# Patient Record
Sex: FEMALE | Race: WHITE | Employment: FULL TIME | URBAN - METROPOLITAN AREA
[De-identification: names, ages, dates, MRNs, and addresses within clinical notes are randomized per-mention and may not be internally consistent; named-entity substitution may affect disease eponyms.]

---

## 2017-01-13 ENCOUNTER — THERAPY VISIT (OUTPATIENT)
Dept: CHIROPRACTIC MEDICINE | Facility: CLINIC | Age: 57
End: 2017-01-13
Payer: COMMERCIAL

## 2017-01-13 DIAGNOSIS — M54.50 CHRONIC LOW BACK PAIN WITHOUT SCIATICA, UNSPECIFIED BACK PAIN LATERALITY: ICD-10-CM

## 2017-01-13 DIAGNOSIS — M99.04 SOMATIC DYSFUNCTION OF SACRAL REGION: Primary | ICD-10-CM

## 2017-01-13 DIAGNOSIS — G89.29 CHRONIC RIGHT SHOULDER PAIN: ICD-10-CM

## 2017-01-13 DIAGNOSIS — G44.221 CHRONIC TENSION-TYPE HEADACHE, INTRACTABLE: ICD-10-CM

## 2017-01-13 DIAGNOSIS — M99.00 HEAD REGION SOMATIC DYSFUNCTION: ICD-10-CM

## 2017-01-13 DIAGNOSIS — M54.2 CERVICALGIA: ICD-10-CM

## 2017-01-13 DIAGNOSIS — M25.511 CHRONIC RIGHT SHOULDER PAIN: ICD-10-CM

## 2017-01-13 DIAGNOSIS — M25.551 HIP PAIN, RIGHT: ICD-10-CM

## 2017-01-13 DIAGNOSIS — G89.29 CHRONIC LOW BACK PAIN WITHOUT SCIATICA, UNSPECIFIED BACK PAIN LATERALITY: ICD-10-CM

## 2017-01-13 PROCEDURE — 98941 CHIROPRACT MANJ 3-4 REGIONS: CPT | Mod: AT | Performed by: CHIROPRACTOR

## 2017-01-13 PROCEDURE — 97035 APP MDLTY 1+ULTRASOUND EA 15: CPT | Performed by: CHIROPRACTOR

## 2017-01-13 NOTE — PROGRESS NOTES
VISIT #:1/2017    SUBJECTIVE:  Chronic pain.   The pt reports R neck and R shoulder pain that radiates into the R lateral arm area. She was travelling for 10 days and recently returned. The pt reports HA. She states she is unable to reduce her discomfort. The pt denies weakness in the extremities or other unusual sx.       OBJECTIVE: C2, C3, T4, T5, T8, L5, suboccipitals, R SCM ( lesser suboccipital nerve) R levator scapula, B QL, R gluteus medius hypertonicity with tenderness to palpation.     ASSESSMENT/PLAN : treatment for chronic pain when exacerbation occurs.    Communicated to patient to extend time between treatment and focus on HEP from PT  to control the pain thereby not relying on manual treatment.       Diagnoses (See diagnoses listed above in encounter notes.)    Response to previous treatment:  Exacerbation/regression       Chiropractic manipulation: C2, C3, T4, T5, T9, T10, T12 Occiput, 3 level manual adjustment    Extraspinal adjustment: NONE TODAY   LAD to R hip with cavitation     Modalities: US Therapy to the R latissimus dorsi -side lying  @ 1.6 christensen/cm2 8 minutes     Therapeutic exercise: none     FU: 1 week with US therapy or ACP    Chronic pain. pt stable   Continue with PT as recommended

## 2017-02-06 ENCOUNTER — THERAPY VISIT (OUTPATIENT)
Dept: CHIROPRACTIC MEDICINE | Facility: CLINIC | Age: 57
End: 2017-02-06
Payer: COMMERCIAL

## 2017-02-06 DIAGNOSIS — M99.02 THORACIC SEGMENT DYSFUNCTION: ICD-10-CM

## 2017-02-06 DIAGNOSIS — M99.04 SOMATIC DYSFUNCTION OF SACRAL REGION: ICD-10-CM

## 2017-02-06 DIAGNOSIS — G44.221 CHRONIC TENSION-TYPE HEADACHE, INTRACTABLE: ICD-10-CM

## 2017-02-06 DIAGNOSIS — G89.29 CHRONIC RIGHT SHOULDER PAIN: Primary | ICD-10-CM

## 2017-02-06 DIAGNOSIS — M99.00 HEAD REGION SOMATIC DYSFUNCTION: ICD-10-CM

## 2017-02-06 DIAGNOSIS — M54.2 CERVICALGIA: ICD-10-CM

## 2017-02-06 DIAGNOSIS — M25.511 CHRONIC RIGHT SHOULDER PAIN: Primary | ICD-10-CM

## 2017-02-06 PROCEDURE — 98941 CHIROPRACT MANJ 3-4 REGIONS: CPT | Mod: AT | Performed by: CHIROPRACTOR

## 2017-02-06 PROCEDURE — 97035 APP MDLTY 1+ULTRASOUND EA 15: CPT | Performed by: CHIROPRACTOR

## 2017-02-06 NOTE — PROGRESS NOTES
VISIT #:3/2017 Chronic pain.     SUBJECTIVE:   The pt reports at least 30% less pain in the R shoulder area post treatment. She noted ability to move the R shoulder without issue. Pain levels have decreased and the frequency of radiating pain to the R elbow is much less. The pt has a low grade HA today in addition to tension at the base of the skull. The pt denies weakness in the extremities or other unusual sx.       OBJECTIVE: C2, C3, T4, T5, T8, L5, suboccipitals, R SCM ( lesser suboccipital nerve) R levator scapula, B QL, R gluteus medius hypertonicity with tenderness to palpation.     ASSESSMENT/PLAN : treatment for chronic pain when exacerbation occurs.    Communicated to patient to extend time between treatment and focus on HEP from PT  to control the pain thereby not relying on manual treatment.       Diagnoses (See diagnoses listed above in encounter notes.)    Response to previous treatment:  Exacerbation/regression       Chiropractic manipulation: C2, C3, T4, T5, T9, T10, T12 Occiput, 3 level manual adjustment    Extraspinal adjustment: NONE TODAY   LAD to R hip with cavitation     Modalities: US Therapy to the R latissimus dorsi/teres prone  @ 1.6 christensen/cm2 8 minutes     Therapeutic exercise: none     FU: 2-3 week with US therapy or ACP for up to 5 treatments    Chronic pain. pt stable   Continue with PT as recommended

## 2017-02-17 ENCOUNTER — THERAPY VISIT (OUTPATIENT)
Dept: CHIROPRACTIC MEDICINE | Facility: CLINIC | Age: 57
End: 2017-02-17
Payer: COMMERCIAL

## 2017-02-17 DIAGNOSIS — M25.551 HIP PAIN, RIGHT: ICD-10-CM

## 2017-02-17 DIAGNOSIS — M99.00 HEAD REGION SOMATIC DYSFUNCTION: ICD-10-CM

## 2017-02-17 DIAGNOSIS — G89.29 CHRONIC LOW BACK PAIN WITHOUT SCIATICA, UNSPECIFIED BACK PAIN LATERALITY: ICD-10-CM

## 2017-02-17 DIAGNOSIS — M54.50 CHRONIC LOW BACK PAIN WITHOUT SCIATICA, UNSPECIFIED BACK PAIN LATERALITY: ICD-10-CM

## 2017-02-17 DIAGNOSIS — G44.221 CHRONIC TENSION-TYPE HEADACHE, INTRACTABLE: ICD-10-CM

## 2017-02-17 DIAGNOSIS — M99.04 SOMATIC DYSFUNCTION OF SACRAL REGION: ICD-10-CM

## 2017-02-17 DIAGNOSIS — M99.02 THORACIC SEGMENT DYSFUNCTION: ICD-10-CM

## 2017-02-17 DIAGNOSIS — M54.2 CERVICALGIA: ICD-10-CM

## 2017-02-17 DIAGNOSIS — G89.29 CHRONIC RIGHT SHOULDER PAIN: Primary | ICD-10-CM

## 2017-02-17 DIAGNOSIS — M25.511 CHRONIC RIGHT SHOULDER PAIN: Primary | ICD-10-CM

## 2017-02-17 PROCEDURE — 97035 APP MDLTY 1+ULTRASOUND EA 15: CPT | Performed by: CHIROPRACTOR

## 2017-02-17 PROCEDURE — 98940 CHIROPRACT MANJ 1-2 REGIONS: CPT | Mod: AT | Performed by: CHIROPRACTOR

## 2017-02-17 NOTE — PROGRESS NOTES
VISIT #:4/2017 Chronic pain.     SUBJECTIVE:   The pt reports at least 40% less pain in the R shoulder area post treatment. She noted ability to move the R shoulder without issue. Pain levels have decreased and the frequency of radiating pain to the R elbow is much less. She was able to play golf in [place] with minimal issue. The pt reports R sided neck pain and upper shoulder tension.     OBJECTIVE: C2, C3, T4, T5, T8, L5, suboccipitals, R SCM ( lesser suboccipital nerve) R levator scapula, B QL, R gluteus medius hypertonicity with tenderness to palpation.     ASSESSMENT/PLAN : treatment for chronic pain when exacerbation occurs.    Communicated to patient to extend time between treatment and focus on HEP from PT  to control the pain thereby not relying on manual treatment.       Diagnoses (See diagnoses listed above in encounter notes.)    Response to previous treatment:  Exacerbation/regression       Chiropractic manipulation: C2, C3, T4, T5,T12 Occiput  Extraspinal adjustment: NONE TODAY   LAD to R hip with cavitation     Modalities: US Therapy to the R latissimus dorsi/teres prone  @ 1.8 christensen/cm2 8 minutes     Therapeutic exercise: none     FU: 2-3 week with US therapy or ACP for up to 5 treatments    Chronic pain. pt stable   Continue with PT as recommended

## 2017-02-17 NOTE — MR AVS SNAPSHOT
After Visit Summary   2/17/2017    Sulema Murphy    MRN: [id number]           Patient Information     Date Of Birth          1960        Visit Information        Provider Department      2/17/2017 7:30 AM Suzi Whittington DC [facility]        Today's Diagnoses     Chronic right shoulder pain    -  1    Thoracic segment dysfunction        Head region somatic dysfunction        Somatic dysfunction of sacral region        Hip pain, right        Chronic tension-type headache, intractable        Cervicalgia        Chronic low back pain without sciatica, unspecified back pain laterality           Follow-ups after your visit        Your next 10 appointments already scheduled     Mar 06, 2017  7:30 AM CST   [facility] with TEGAN Baez ([facility]  )    [address] [place] [address]. [address]  [place] [address] [phone]              Who to contact     If you have questions or need follow up information about today's clinic visit or your schedule please contact [facility] directly at [phone].  Normal or non-critical lab and imaging results will be communicated to you by Pluto.TVhart, letter or phone within 4 business days after the clinic has received the results. If you do not hear from us within 7 days, please contact the clinic through Cytoxt or phone. If you have a critical or abnormal lab result, we will notify you by phone as soon as possible.  Submit refill requests through FitnessManager or call your pharmacy and they will forward the refill request to us. Please allow 3 business days for your refill to be completed.          Additional Information About Your Visit        Pluto.TVhart Information     FitnessManager gives you secure access to your electronic health record. If you see a primary care provider, you can also send messages to your care team and make appointments.  If you have questions, please call your primary care clinic.  If you do not have a primary care provider, please call [phone] and they will assist you.        Care EveryWhere ID     This is your Care EveryWhere ID. This could be used by other organizations to access your [facility] medical records  [id number]         Blood Pressure from Last 3 Encounters:   02/22/16 114/78   08/14/13 112/62   05/14/15 121/79    Weight from Last 3 Encounters:   02/22/16 53.5 kg (118 lb)   08/14/13 54.4 kg (120 lb)   05/14/15 54.9 kg (121 lb)              We Performed the Following     CHIROPRAC MANIP,SPINAL,1-2 REGIONS     ULTRASOUND THERAPY        Primary Care Provider Office Phone # Fax #    [facility] [phone]       [address] [place] [address], [address]  [facility] [address]        Thank you!     Thank you for choosing [facility]  [facility] [place] [facility]IR[facility]  for your care. Our goal is always to provide you with excellent care. Hearing back from our patients is one way we can continue to improve our services. Please take a few minutes to complete the written survey that you may receive in the mail after your visit with us. Thank you!             Your Updated Medication List - Protect others around you: Learn how to safely use, store and throw away your medicines at www.disposemymeds.org.          This list is accurate as of: 2/17/17  7:53 AM.  Always use your most recent med list.                   Brand Name Dispense Instructions for use    CALCIUM PO      Take  by mouth.       cycloSPORINE 0.05 % ophthalmic emulsion    RESTASIS     1 drop every 12 hours.       GLUCOSAMINE-CHONDROITIN PO      Take  by mouth.       METROLOTION 0.75 % Lotn   Generic drug:  metroNIDAZOLE      Apply thin layer to the affected area by topical route 2 times per day in the morning and evening       MULTI-VITAMINS PO      Take  by mouth.       SYNTHROID 137 MCG tablet   Generic drug:   levothyroxine     3 MONTHS    ONE TABLET DAILY IN THE MORNING

## 2017-03-06 ENCOUNTER — THERAPY VISIT (OUTPATIENT)
Dept: CHIROPRACTIC MEDICINE | Facility: CLINIC | Age: 57
End: 2017-03-06
Payer: COMMERCIAL

## 2017-03-06 DIAGNOSIS — M54.2 CERVICALGIA: ICD-10-CM

## 2017-03-06 DIAGNOSIS — M25.551 HIP PAIN, RIGHT: ICD-10-CM

## 2017-03-06 DIAGNOSIS — G89.29 CHRONIC RIGHT SHOULDER PAIN: Primary | ICD-10-CM

## 2017-03-06 DIAGNOSIS — M54.50 CHRONIC LOW BACK PAIN WITHOUT SCIATICA, UNSPECIFIED BACK PAIN LATERALITY: ICD-10-CM

## 2017-03-06 DIAGNOSIS — M99.02 THORACIC SEGMENT DYSFUNCTION: ICD-10-CM

## 2017-03-06 DIAGNOSIS — M99.04 SOMATIC DYSFUNCTION OF SACRAL REGION: ICD-10-CM

## 2017-03-06 DIAGNOSIS — G44.221 CHRONIC TENSION-TYPE HEADACHE, INTRACTABLE: ICD-10-CM

## 2017-03-06 DIAGNOSIS — M99.00 HEAD REGION SOMATIC DYSFUNCTION: ICD-10-CM

## 2017-03-06 DIAGNOSIS — G89.29 CHRONIC LOW BACK PAIN WITHOUT SCIATICA, UNSPECIFIED BACK PAIN LATERALITY: ICD-10-CM

## 2017-03-06 DIAGNOSIS — M25.511 CHRONIC RIGHT SHOULDER PAIN: Primary | ICD-10-CM

## 2017-03-06 PROCEDURE — 97035 APP MDLTY 1+ULTRASOUND EA 15: CPT | Performed by: CHIROPRACTOR

## 2017-03-06 PROCEDURE — 98940 CHIROPRACT MANJ 1-2 REGIONS: CPT | Mod: AT | Performed by: CHIROPRACTOR

## 2017-03-06 NOTE — MR AVS SNAPSHOT
After Visit Summary   3/6/2017    Sulema Murphy    MRN: [id number]           Patient Information     Date Of Birth          1960        Visit Information        Provider Department      3/6/2017 7:30 AM Suzi Whittington [facility] [place] [facility]        Today's Diagnoses     Chronic right shoulder pain    -  1    Thoracic segment dysfunction        Head region somatic dysfunction        Somatic dysfunction of sacral region        Hip pain, right        Chronic tension-type headache, intractable        Cervicalgia        Chronic low back pain without sciatica, unspecified back pain laterality           Follow-ups after your visit        Your next 10 appointments already scheduled     Mar 20, 2017  1:30 PM CDT   [facility] Chiropractor with TEGAN Baez ([facility]  )    [address]  [place] [id number] [phone]            Apr 03, 2017  1:30 PM CDT   [facility] Chiropractor with TEGAN Baez ([facility] [place]  )    [address]  [place] [id number] [phone]              Who to contact     If you have questions or need follow up information about today's clinic visit or your schedule please contact [facility] directly at [phone].  Normal or non-critical lab and imaging results will be communicated to you by Revision Militaryhart, letter or phone within 4 business days after the clinic has received the results. If you do not hear from us within 7 days, please contact the clinic through Revision Militaryhart or phone. If you have a critical or abnormal lab result, we will notify you by phone as soon as possible.  Submit refill requests through Synbiota or call your pharmacy and they will forward the refill request to us. Please allow 3 business days for your refill to be completed.          Additional Information About Your Visit        Revision MilitaryharBigDoor Information     Synbiota gives you secure access to your electronic health record. If you see  a primary care provider, you can also send messages to your care team and make appointments. If you have questions, please call your primary care clinic.  If you do not have a primary care provider, please call [phone] and they will assist you.        Care EveryWhere ID     This is your Care EveryWhere ID. This could be used by other organizations to access your [facility] medical records  [id number]         Blood Pressure from Last 3 Encounters:   02/22/16 114/78   08/14/13 112/62   05/14/15 121/79    Weight from Last 3 Encounters:   02/22/16 53.5 kg (118 lb)   08/14/13 54.4 kg (120 lb)   05/14/15 54.9 kg (121 lb)              We Performed the Following     CHIROPRAC MANIP,SPINAL,1-2 REGIONS     ULTRASOUND THERAPY        Primary Care Provider Office Phone # Fax #    [facility] [phone]       [address] [place] [address], [address]  [facility] [address]        Thank you!     Thank you for choosing [facility]  for your care. Our goal is always to provide you with excellent care. Hearing back from our patients is one way we can continue to improve our services. Please take a few minutes to complete the written survey that you may receive in the mail after your visit with us. Thank you!             Your Updated Medication List - Protect others around you: Learn how to safely use, store and throw away your medicines at www.disposemymeds.org.          This list is accurate as of: 3/6/17 11:23 AM.  Always use your most recent med list.                   Brand Name Dispense Instructions for use    CALCIUM PO      Take  by mouth.       cycloSPORINE 0.05 % ophthalmic emulsion    RESTASIS     1 drop every 12 hours.       GLUCOSAMINE-CHONDROITIN PO      Take  by mouth.       METROLOTION 0.75 % Lotn   Generic drug:  metroNIDAZOLE      Apply thin layer to the affected area by topical route 2 times per day in the morning and evening       MULTI-VITAMINS PO      Take  by mouth.        SYNTHROID 137 MCG tablet   Generic drug:  levothyroxine     3 MONTHS    ONE TABLET DAILY IN THE MORNING

## 2017-03-06 NOTE — PROGRESS NOTES
VISIT #:5/2017 Chronic pain.     SUBJECTIVE:   The pt reports at least 50% less pain in the R shoulder area post treatment. She will feel radiating pain in the R arm to the thumb that seems to originate in the R shoulder area. This will not cause weakness. The pt will feel the paresthesia in the middle of the day. Golfing and other repetitious activities will aggravate the px. The pt experienced moderate HA this weekend with no specific cause. She denies weakness or other unusual sx.     OBJECTIVE: C2, C3, T4, T5, T8, L5, suboccipitals, R SCM ( lesser suboccipital nerve) R levator scapula, B QL, R gluteus medius hypertonicity with tenderness to palpation.     ASSESSMENT/PLAN : treatment for chronic pain when exacerbation occurs.    Communicated to patient to extend time between treatment and focus on HEP from PT  to control the pain thereby not relying on manual treatment.       Diagnoses (See diagnoses listed above in encounter notes.)    Response to previous treatment:  Pt responding well to therapy       Chiropractic manipulation: C2, C3, T4, T5,T12 Occiput  Extraspinal adjustment: NONE TODAY   LAD to R hip with cavitation     Modalities: US Therapy to the R latissimus dorsi/teres prone  @ 1.6 christensen/cm2 8 minutes -side lying    Therapeutic exercise: none     FU: 2-3 week with US therapy or ACP for up to 5 treatments    Chronic pain. pt stable   Continue with PT as recommended

## 2017-03-20 ENCOUNTER — THERAPY VISIT (OUTPATIENT)
Dept: CHIROPRACTIC MEDICINE | Facility: CLINIC | Age: 57
End: 2017-03-20
Payer: COMMERCIAL

## 2017-03-20 DIAGNOSIS — G89.29 CHRONIC LOW BACK PAIN WITHOUT SCIATICA, UNSPECIFIED BACK PAIN LATERALITY: ICD-10-CM

## 2017-03-20 DIAGNOSIS — G89.29 CHRONIC RIGHT SHOULDER PAIN: Primary | ICD-10-CM

## 2017-03-20 DIAGNOSIS — G44.221 CHRONIC TENSION-TYPE HEADACHE, INTRACTABLE: ICD-10-CM

## 2017-03-20 DIAGNOSIS — M54.50 CHRONIC LOW BACK PAIN WITHOUT SCIATICA, UNSPECIFIED BACK PAIN LATERALITY: ICD-10-CM

## 2017-03-20 DIAGNOSIS — M25.511 CHRONIC RIGHT SHOULDER PAIN: Primary | ICD-10-CM

## 2017-03-20 DIAGNOSIS — M99.00 HEAD REGION SOMATIC DYSFUNCTION: ICD-10-CM

## 2017-03-20 DIAGNOSIS — M99.04 SOMATIC DYSFUNCTION OF SACRAL REGION: ICD-10-CM

## 2017-03-20 DIAGNOSIS — M99.02 THORACIC SEGMENT DYSFUNCTION: ICD-10-CM

## 2017-03-20 DIAGNOSIS — M54.2 CERVICALGIA: ICD-10-CM

## 2017-03-20 DIAGNOSIS — M25.551 HIP PAIN, RIGHT: ICD-10-CM

## 2017-03-20 PROCEDURE — 98941 CHIROPRACT MANJ 3-4 REGIONS: CPT | Mod: AT | Performed by: CHIROPRACTOR

## 2017-03-20 PROCEDURE — 97810 ACUP 1/> WO ESTIM 1ST 15 MIN: CPT | Mod: GA | Performed by: CHIROPRACTOR

## 2017-03-20 NOTE — MR AVS SNAPSHOT
After Visit Summary   3/20/2017    Sulema Murphy    MRN: [id number]           Patient Information     Date Of Birth          1960        Visit Information        Provider Department      3/20/2017 1:30 PM Suzi Whittington DC [facility] [place] [facility]        Today's Diagnoses     Chronic right shoulder pain    -  1    Thoracic segment dysfunction        Head region somatic dysfunction        Somatic dysfunction of sacral region        Hip pain, right        Chronic tension-type headache, intractable        Cervicalgia        Chronic low back pain without sciatica, unspecified back pain laterality           Follow-ups after your visit        Your next 10 appointments already scheduled     Apr 03, 2017  1:30 PM CDT   [facility] Chiropractor with TEGAN Baez ([facility]  )    [address]  [place] [address] [phone]              Who to contact     If you have questions or need follow up information about today's clinic visit or your schedule please contact [facility] directly at [phone].  Normal or non-critical lab and imaging results will be communicated to you by Training Intelligencehart, letter or phone within 4 business days after the clinic has received the results. If you do not hear from us within 7 days, please contact the clinic through Intoloopt or phone. If you have a critical or abnormal lab result, we will notify you by phone as soon as possible.  Submit refill requests through Telelogos or call your pharmacy and they will forward the refill request to us. Please allow 3 business days for your refill to be completed.          Additional Information About Your Visit        Training Intelligencehart Information     Telelogos gives you secure access to your electronic health record. If you see a primary care provider, you can also send messages to your care team and make appointments. If you have questions, please call your primary care clinic.  If you do not have a  primary care provider, please call [phone] and they will assist you.        Care EveryWhere ID     This is your Care EveryWhere ID. This could be used by other organizations to access your [place] medical records  [id number]         Blood Pressure from Last 3 Encounters:   02/22/16 114/78   08/14/13 112/62   05/14/15 121/79    Weight from Last 3 Encounters:   02/22/16 53.5 kg (118 lb)   08/14/13 54.4 kg (120 lb)   05/14/15 54.9 kg (121 lb)              We Performed the Following     ACUPUNCTURE, 1+ NEEDLES, W/O ELECTRICAL STIM; INIT 15 MIN PERSONAL CONTACT     CHIROPRAC MANIP,SPINAL,3-4 REGIONS        Primary Care Provider Office Phone # Fax #    [facility] [phone]       [facility] [facility], [address]  [place] [address]        Thank you!     Thank you for choosing [facility]  for your care. Our goal is always to provide you with excellent care. Hearing back from our patients is one way we can continue to improve our services. Please take a few minutes to complete the written survey that you may receive in the mail after your visit with us. Thank you!             Your Updated Medication List - Protect others around you: Learn how to safely use, store and throw away your medicines at www.disposemymeds.org.          This list is accurate as of: 3/20/17  3:03 PM.  Always use your most recent med list.                   Brand Name Dispense Instructions for use    CALCIUM PO      Take  by mouth.       cycloSPORINE 0.05 % ophthalmic emulsion    RESTASIS     1 drop every 12 hours.       GLUCOSAMINE-CHONDROITIN PO      Take  by mouth.       METROLOTION 0.75 % Lotn   Generic drug:  metroNIDAZOLE      Apply thin layer to the affected area by topical route 2 times per day in the morning and evening       MULTI-VITAMINS PO      Take  by mouth.       SYNTHROID 137 MCG tablet   Generic drug:  levothyroxine     3 MONTHS    ONE TABLET DAILY IN THE MORNING

## 2017-03-20 NOTE — PROGRESS NOTES
VISIT #:5/2017 Chronic pain.     SUBJECTIVE:   The pt reports at least 50% less pain in the R shoulder area post treatment. She continues to feel improvement from therapy. Today she would like to try ACP for a few sessions. The pt continues with all HEP from PT to manage the R shoulder and arm. The pt reports R upper cervical tension and decreased ability to turn the head. She denies HA since the previous therapy. The pt is pleased with her progress.     OBJECTIVE: C2, C3, T4, T5, T8, L5, suboccipitals, R SCM ( lesser suboccipital nerve) R levator scapula, B QL, R gluteus medius hypertonicity with tenderness to palpation.     ASSESSMENT/PLAN : treatment for chronic pain when exacerbation occurs.    Communicated to patient to extend time between treatment and focus on HEP from PT  to control the pain thereby not relying on manual treatment.       Diagnoses (See diagnoses listed above in encounter notes.)    Response to previous treatment:  Pt responding well to therapy       Chiropractic manipulation: C2, C3, T4, T5,T12 Occiput right activator and manual adjustment  Extraspinal adjustment: NONE TODAY   LAD to R hip with cavitation     Modalities: ACP points to the R R/C and cervical musculature 15 minutes prone       Therapeutic exercise: none     FU: 2-3 weeks  ACP for up to 5-6 treatments    Chronic pain. pt stable   Continue with PT as recommended

## 2017-04-03 ENCOUNTER — THERAPY VISIT (OUTPATIENT)
Dept: CHIROPRACTIC MEDICINE | Facility: CLINIC | Age: 57
End: 2017-04-03
Payer: COMMERCIAL

## 2017-04-03 DIAGNOSIS — G89.29 CHRONIC LOW BACK PAIN WITHOUT SCIATICA, UNSPECIFIED BACK PAIN LATERALITY: ICD-10-CM

## 2017-04-03 DIAGNOSIS — M54.2 CERVICALGIA: ICD-10-CM

## 2017-04-03 DIAGNOSIS — M99.02 THORACIC SEGMENT DYSFUNCTION: ICD-10-CM

## 2017-04-03 DIAGNOSIS — M99.04 SOMATIC DYSFUNCTION OF SACRAL REGION: ICD-10-CM

## 2017-04-03 DIAGNOSIS — M54.12 CERVICOBRACHIAL NEURALGIA: Primary | ICD-10-CM

## 2017-04-03 DIAGNOSIS — G89.29 CHRONIC RIGHT SHOULDER PAIN: ICD-10-CM

## 2017-04-03 DIAGNOSIS — M99.00 HEAD REGION SOMATIC DYSFUNCTION: ICD-10-CM

## 2017-04-03 DIAGNOSIS — G44.221 CHRONIC TENSION-TYPE HEADACHE, INTRACTABLE: ICD-10-CM

## 2017-04-03 DIAGNOSIS — M54.50 CHRONIC LOW BACK PAIN WITHOUT SCIATICA, UNSPECIFIED BACK PAIN LATERALITY: ICD-10-CM

## 2017-04-03 DIAGNOSIS — M25.511 CHRONIC RIGHT SHOULDER PAIN: ICD-10-CM

## 2017-04-03 PROCEDURE — 98940 CHIROPRACT MANJ 1-2 REGIONS: CPT | Mod: AT | Performed by: CHIROPRACTOR

## 2017-04-03 PROCEDURE — 97810 ACUP 1/> WO ESTIM 1ST 15 MIN: CPT | Mod: GA | Performed by: CHIROPRACTOR

## 2017-04-03 NOTE — MR AVS SNAPSHOT
After Visit Summary   4/3/2017    Sulema Murphy    MRN: [id number]           Patient Information     Date Of Birth          1960        Visit Information        Provider Department      4/3/2017 1:30 PM Suzi Whittington [facility] [facility]        Today's Diagnoses     Cervicobrachial neuralgia    -  1    Thoracic segment dysfunction        Head region somatic dysfunction        Somatic dysfunction of sacral region        Chronic right shoulder pain        Chronic tension-type headache, intractable        Cervicalgia        Chronic low back pain without sciatica, unspecified back pain laterality           Follow-ups after your visit        Your next 10 appointments already scheduled     Apr 17, 2017  7:45 AM CDT   [facility] Chiropractor with TEGAN Baez ([facility] [place]  )    [address]  [place] [id number] [phone]            May 01, 2017  7:30 AM CDT   [facility] Chiropractor with TEGAN Baez ([facility]  )    [address]  [place] [id number] [phone]              Who to contact     If you have questions or need follow up information about today's clinic visit or your schedule please contact [facility] directly at [phone].  Normal or non-critical lab and imaging results will be communicated to you by "Enfold[employer]."hart, letter or phone within 4 business days after the clinic has received the results. If you do not hear from us within 7 days, please contact the clinic through "Enfold[employer]hart or phone. If you have a critical or abnormal lab result, we will notify you by phone as soon as possible.  Submit refill requests through MaistorPlus or call your pharmacy and they will forward the refill request to us. Please allow 3 business days for your refill to be completed.          Additional Information About Your Visit        MaistorPlus Information     MaistorPlus gives you secure access to your electronic health record.  If you see a primary care provider, you can also send messages to your care team and make appointments. If you have questions, please call your primary care clinic.  If you do not have a primary care provider, please call [phone] and they will assist you.        Care EveryWhere ID     This is your Care EveryWhere ID. This could be used by other organizations to access your [facility] medical records  [id number]         Blood Pressure from Last 3 Encounters:   02/22/16 114/78   08/14/13 112/62   05/14/15 121/79    Weight from Last 3 Encounters:   02/22/16 53.5 kg (118 lb)   08/14/13 54.4 kg (120 lb)   05/14/15 54.9 kg (121 lb)              We Performed the Following     ACUPUNCTURE, 1+ NEEDLES, W/O ELECTRICAL STIM; INIT 15 MIN PERSONAL CONTACT     CHIROPRAC MANIP,SPINAL,1-2 REGIONS        Primary Care Provider Office Phone # Fax #    [facility] [phone]       [address] [place] [address], [address]  [facility] [address]        Thank you!     Thank you for choosing [facility]  for your care. Our goal is always to provide you with excellent care. Hearing back from our patients is one way we can continue to improve our services. Please take a few minutes to complete the written survey that you may receive in the mail after your visit with us. Thank you!             Your Updated Medication List - Protect others around you: Learn how to safely use, store and throw away your medicines at www.disposemymeds.org.          This list is accurate as of: 4/3/17  3:05 PM.  Always use your most recent med list.                   Brand Name Dispense Instructions for use    CALCIUM PO      Take  by mouth.       cycloSPORINE 0.05 % ophthalmic emulsion    RESTASIS     1 drop every 12 hours.       GLUCOSAMINE-CHONDROITIN PO      Take  by mouth.       METROLOTION 0.75 % Lotn   Generic drug:  metroNIDAZOLE      Apply thin layer to the affected area by topical route 2 times per day in the morning  and evening       MULTI-VITAMINS PO      Take  by mouth.       SYNTHROID 137 MCG tablet   Generic drug:  levothyroxine     3 MONTHS    ONE TABLET DAILY IN THE MORNING

## 2017-04-03 NOTE — PROGRESS NOTES
VISIT #:5/2017 Chronic pain.     SUBJECTIVE:   The pt reports at least 65% less pain in the R shoulder area post treatment. She continues to feel improvement from therapy. She notes decreasing nerve irritation  in the R arm and lateral fingers. Reaching higher does not increase pain in the R arm. She is pleased with her progress and denies weakness or other unusual sx.       OBJECTIVE: C2, C3, T4, T5, T8, L5, suboccipitals, R SCM ( lesser suboccipital nerve) R levator scapula, B QL, R gluteus medius hypertonicity with tenderness to palpation.     ASSESSMENT/PLAN : treatment for chronic pain when exacerbation occurs.    Communicated to patient to extend time between treatment and focus on HEP from PT  to control the pain thereby not relying on manual treatment.       Diagnoses (See diagnoses listed above in encounter notes.)    Response to previous treatment:  Pt responding well to therapy       Chiropractic manipulation: C2, C3, T4, T5,T12 Occiput right activator and manual adjustment  Extraspinal adjustment: NONE TODAY      Modalities: ACP points to the R R/C and cervical musculature 15 minutes prone       Therapeutic exercise: none     FU: 2-3 weeks  ACP for 2 additional treatments      Chronic pain. pt stable   Continue with PT as recommended

## 2017-04-17 ENCOUNTER — THERAPY VISIT (OUTPATIENT)
Dept: CHIROPRACTIC MEDICINE | Facility: CLINIC | Age: 57
End: 2017-04-17
Payer: COMMERCIAL

## 2017-04-17 DIAGNOSIS — M54.2 CERVICALGIA: ICD-10-CM

## 2017-04-17 DIAGNOSIS — M99.02 THORACIC SEGMENT DYSFUNCTION: ICD-10-CM

## 2017-04-17 DIAGNOSIS — M54.12 CERVICOBRACHIAL NEURALGIA: ICD-10-CM

## 2017-04-17 DIAGNOSIS — M25.511 CHRONIC RIGHT SHOULDER PAIN: ICD-10-CM

## 2017-04-17 DIAGNOSIS — G89.29 CHRONIC RIGHT SHOULDER PAIN: ICD-10-CM

## 2017-04-17 DIAGNOSIS — M99.00 HEAD REGION SOMATIC DYSFUNCTION: ICD-10-CM

## 2017-04-17 DIAGNOSIS — G44.221 CHRONIC TENSION-TYPE HEADACHE, INTRACTABLE: ICD-10-CM

## 2017-04-17 DIAGNOSIS — M99.04 SOMATIC DYSFUNCTION OF SACRAL REGION: ICD-10-CM

## 2017-04-17 DIAGNOSIS — M54.50 CHRONIC LOW BACK PAIN WITHOUT SCIATICA, UNSPECIFIED BACK PAIN LATERALITY: ICD-10-CM

## 2017-04-17 DIAGNOSIS — G89.29 CHRONIC LOW BACK PAIN WITHOUT SCIATICA, UNSPECIFIED BACK PAIN LATERALITY: ICD-10-CM

## 2017-04-17 PROCEDURE — 97810 ACUP 1/> WO ESTIM 1ST 15 MIN: CPT | Mod: GA | Performed by: CHIROPRACTOR

## 2017-04-17 PROCEDURE — 98940 CHIROPRACT MANJ 1-2 REGIONS: CPT | Mod: AT | Performed by: CHIROPRACTOR

## 2017-04-17 NOTE — PROGRESS NOTES
VISIT #:7/2017 Chronic pain- R shoulder/paresthesias    SUBJECTIVE:   The pt reports at least 65% less pain in the R shoulder area post treatment-no significant change from the previous week however less tingling in the R hand. She continues to feel improvement from therapy. She notes decreasing nerve irritation  in the R arm and lateral fingers. The pt experienced on HA this past week which she took medication for. Reaching higher does not increase pain in the R arm. She is pleased with her progress and denies weakness or other unusual sx.       OBJECTIVE: C2, C3 right, T9, T10 suboccipitals, R SCM ( lesser suboccipital nerve) R levator scapula, B QL, R gluteus medius hypertonicity with tenderness to palpation.     ASSESSMENT/PLAN : treatment for chronic pain when exacerbation occurs.    Communicated to patient to extend time between treatment and focus on HEP from PT  to control the pain thereby not relying on manual treatment.       Diagnoses (See diagnoses listed above in encounter notes.)    Response to previous treatment:  Pt responding well to therapy       Chiropractic manipulation: C2, C3, T10,T12 Occiput right activator and manual adjustment  Extraspinal adjustment: NONE TODAY      Modalities: ACP points to the R R/C and cervical musculature 15 minutes prone       Therapeutic exercise: none     FU: 2-3 weeks  ACP for 2 additional treatments      Chronic pain. pt stable   Continue with PT as recommended

## 2017-04-17 NOTE — MR AVS SNAPSHOT
After Visit Summary   4/17/2017    Sulema Murphy    MRN: [id number]           Patient Information     Date Of Birth          1960        Visit Information        Provider Department      4/17/2017 7:45 AM Suzi Whittington DC [facility]        Today's Diagnoses     Cervicobrachial neuralgia        Thoracic segment dysfunction        Head region somatic dysfunction        Somatic dysfunction of sacral region        Chronic right shoulder pain        Chronic tension-type headache, intractable        Cervicalgia        Chronic low back pain without sciatica, unspecified back pain laterality           Follow-ups after your visit        Your next 10 appointments already scheduled     May 01, 2017  7:30 AM CDT   [facility] with TEGAN Baez ([facility]  )    [address]  [place] [address] [phone]              Who to contact     If you have questions or need follow up information about today's clinic visit or your schedule please contact [facility] directly at [phone].  Normal or non-critical lab and imaging results will be communicated to you by Blendspacehart, letter or phone within 4 business days after the clinic has received the results. If you do not hear from us within 7 days, please contact the clinic through Music Factoryt or phone. If you have a critical or abnormal lab result, we will notify you by phone as soon as possible.  Submit refill requests through Nirvaha or call your pharmacy and they will forward the refill request to us. Please allow 3 business days for your refill to be completed.          Additional Information About Your Visit        Blendspacehart Information     Nirvaha gives you secure access to your electronic health record. If you see a primary care provider, you can also send messages to your care team and make appointments. If you have questions, please call your primary care clinic.  If you do not have a  primary care provider, please call [phone] and they will assist you.        Care EveryWhere ID     This is your Care EveryWhere ID. This could be used by other organizations to access your [facility] medical records  [id number]         Blood Pressure from Last 3 Encounters:   02/22/16 114/78   08/14/13 112/62   05/14/15 121/79    Weight from Last 3 Encounters:   02/22/16 53.5 kg (118 lb)   08/14/13 54.4 kg (120 lb)   05/14/15 54.9 kg (121 lb)              We Performed the Following     ACUPUNCTURE, 1+ NEEDLES, W/O ELECTRICAL STIM; INIT 15 MIN PERSONAL CONTACT     CHIROPRAC MANIP,SPINAL,1-2 REGIONS        Primary Care Provider Office Phone # Fax #    [facility] [phone]       [facility] [facility], [address]  [place] [address]        Thank you!     Thank you for choosing [facility]  for your care. Our goal is always to provide you with excellent care. Hearing back from our patients is one way we can continue to improve our services. Please take a few minutes to complete the written survey that you may receive in the mail after your visit with us. Thank you!             Your Updated Medication List - Protect others around you: Learn how to safely use, store and throw away your medicines at www.disposemymeds.org.          This list is accurate as of: 4/17/17  8:14 AM.  Always use your most recent med list.                   Brand Name Dispense Instructions for use    CALCIUM PO      Take  by mouth.       cycloSPORINE 0.05 % ophthalmic emulsion    RESTASIS     1 drop every 12 hours.       GLUCOSAMINE-CHONDROITIN PO      Take  by mouth.       METROLOTION 0.75 % Lotn   Generic drug:  metroNIDAZOLE      Apply thin layer to the affected area by topical route 2 times per day in the morning and evening       MULTI-VITAMINS PO      Take  by mouth.       SYNTHROID 137 MCG tablet   Generic drug:  levothyroxine     3 MONTHS    ONE TABLET DAILY IN THE MORNING

## 2017-05-01 ENCOUNTER — THERAPY VISIT (OUTPATIENT)
Dept: CHIROPRACTIC MEDICINE | Facility: CLINIC | Age: 57
End: 2017-05-01
Payer: COMMERCIAL

## 2017-05-01 DIAGNOSIS — M25.511 CHRONIC RIGHT SHOULDER PAIN: ICD-10-CM

## 2017-05-01 DIAGNOSIS — M99.04 SOMATIC DYSFUNCTION OF SACRAL REGION: ICD-10-CM

## 2017-05-01 DIAGNOSIS — M54.12 CERVICOBRACHIAL NEURALGIA: Primary | ICD-10-CM

## 2017-05-01 DIAGNOSIS — M99.02 THORACIC SEGMENT DYSFUNCTION: ICD-10-CM

## 2017-05-01 DIAGNOSIS — G89.29 CHRONIC RIGHT SHOULDER PAIN: ICD-10-CM

## 2017-05-01 DIAGNOSIS — M99.00 HEAD REGION SOMATIC DYSFUNCTION: ICD-10-CM

## 2017-05-01 DIAGNOSIS — M54.2 CERVICALGIA: ICD-10-CM

## 2017-05-01 DIAGNOSIS — G44.221 CHRONIC TENSION-TYPE HEADACHE, INTRACTABLE: ICD-10-CM

## 2017-05-01 PROCEDURE — 98940 CHIROPRACT MANJ 1-2 REGIONS: CPT | Mod: AT | Performed by: CHIROPRACTOR

## 2017-05-01 PROCEDURE — 97810 ACUP 1/> WO ESTIM 1ST 15 MIN: CPT | Mod: GA | Performed by: CHIROPRACTOR

## 2017-05-01 NOTE — PROGRESS NOTES
VISIT #:8/2017 Chronic pain- R shoulder/paresthesias    SUBJECTIVE:   The pt reports at least 75% less pain in the R shoulder area post treatment. She continues progressing from this episode. The pt reports significantly less R arm pain on a daily basis. She notes R sided thoracic paraspinal pain that causes moderate irritation when intermittently. She states it is so sore at times it will make her nauseous. She reports slight R sided neck pain today. The pt denies weakness or other unusual sx.     OBJECTIVE: C2, C3 right, T9, T10 suboccipitals, R SCM ( lesser suboccipital nerve) R levator scapula, B QL, R gluteus medius hypertonicity with tenderness to palpation.     ASSESSMENT/PLAN : treatment for chronic pain when exacerbation occurs.    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 Reduction of therapy when pt has reached 80-85% imporovement    Diagnoses (See diagnoses listed above in encounter notes.)    Response to previous treatment:  Pt responding well to therapy       Chiropractic manipulation: C2, C3, T10,T12 Occiput right activator and manual adjustment  Extraspinal adjustment: NONE TODAY      Modalities: ACP points to the R R/C and cervical musculature, R arm and hand. R thoracic paraspinals 15 minutes prone       Therapeutic exercise: none     FU: 2-3 weeks  ACP for 1-2 additional treatments      Chronic pain. pt stable   Continue with PT as recommended

## 2017-05-01 NOTE — MR AVS SNAPSHOT
After Visit Summary   5/1/2017    Sulema Murphy    MRN: [id number]           Patient Information     Date Of Birth          1960        Visit Information        Provider Department      5/1/2017 7:30 AM Suzi Whittington DC [facility]        Today's Diagnoses     Cervicobrachial neuralgia    -  1    Thoracic segment dysfunction        Head region somatic dysfunction        Somatic dysfunction of sacral region        Chronic right shoulder pain        Chronic tension-type headache, intractable        Cervicalgia           Follow-ups after your visit        Who to contact     If you have questions or need follow up information about today's clinic visit or your schedule please contact [facility] directly at [phone].  Normal or non-critical lab and imaging results will be communicated to you by MyChart, letter or phone within 4 business days after the clinic has received the results. If you do not hear from us within 7 days, please contact the clinic through Pressgramhart or phone. If you have a critical or abnormal lab result, we will notify you by phone as soon as possible.  Submit refill requests through Dizzion or call your pharmacy and they will forward the refill request to us. Please allow 3 business days for your refill to be completed.          Additional Information About Your Visit        MyChart Information     Dizzion gives you secure access to your electronic health record. If you see a primary care provider, you can also send messages to your care team and make appointments. If you have questions, please call your primary care clinic.  If you do not have a primary care provider, please call [phone] and they will assist you.        Care EveryWhere ID     This is your Care EveryWhere ID. This could be used by other organizations to access your [facility] medical records  [id number]         Blood Pressure from Last 3 Encounters:   02/22/16 114/78   08/14/13  112/62   05/14/15 121/79    Weight from Last 3 Encounters:   02/22/16 53.5 kg (118 lb)   08/14/13 54.4 kg (120 lb)   05/14/15 54.9 kg (121 lb)              We Performed the Following     ACUPUNCTURE, 1+ NEEDLES, W/O ELECTRICAL STIM; INIT 15 MIN PERSONAL CONTACT     CHIROPRAC MANIP,SPINAL,1-2 REGIONS        Primary Care Provider Office Phone # Fax #    [facility] [facility] [phone]       [address] [place] [address], [address]  [place] [address]        Thank you!     Thank you for choosing [facility]  for your care. Our goal is always to provide you with excellent care. Hearing back from our patients is one way we can continue to improve our services. Please take a few minutes to complete the written survey that you may receive in the mail after your visit with us. Thank you!             Your Updated Medication List - Protect others around you: Learn how to safely use, store and throw away your medicines at www.disposemymeds.org.          This list is accurate as of: 5/1/17  8:32 AM.  Always use your most recent med list.                   Brand Name Dispense Instructions for use    CALCIUM PO      Take  by mouth.       cycloSPORINE 0.05 % ophthalmic emulsion    RESTASIS     1 drop every 12 hours.       GLUCOSAMINE-CHONDROITIN PO      Take  by mouth.       METROLOTION 0.75 % Lotn   Generic drug:  metroNIDAZOLE      Apply thin layer to the affected area by topical route 2 times per day in the morning and evening       MULTI-VITAMINS PO      Take  by mouth.       SYNTHROID 137 MCG tablet   Generic drug:  levothyroxine     3 MONTHS    ONE TABLET DAILY IN THE MORNING

## 2017-05-15 ENCOUNTER — THERAPY VISIT (OUTPATIENT)
Dept: CHIROPRACTIC MEDICINE | Facility: CLINIC | Age: 57
End: 2017-05-15
Payer: COMMERCIAL

## 2017-05-15 DIAGNOSIS — M99.02 THORACIC SEGMENT DYSFUNCTION: ICD-10-CM

## 2017-05-15 DIAGNOSIS — M25.511 CHRONIC RIGHT SHOULDER PAIN: ICD-10-CM

## 2017-05-15 DIAGNOSIS — M54.2 CERVICALGIA: ICD-10-CM

## 2017-05-15 DIAGNOSIS — M99.00 HEAD REGION SOMATIC DYSFUNCTION: ICD-10-CM

## 2017-05-15 DIAGNOSIS — G89.29 CHRONIC RIGHT SHOULDER PAIN: ICD-10-CM

## 2017-05-15 DIAGNOSIS — M99.04 SOMATIC DYSFUNCTION OF SACRAL REGION: ICD-10-CM

## 2017-05-15 DIAGNOSIS — M54.12 CERVICOBRACHIAL NEURALGIA: Primary | ICD-10-CM

## 2017-05-15 DIAGNOSIS — G44.221 CHRONIC TENSION-TYPE HEADACHE, INTRACTABLE: ICD-10-CM

## 2017-05-15 PROCEDURE — 98940 CHIROPRACT MANJ 1-2 REGIONS: CPT | Mod: AT | Performed by: CHIROPRACTOR

## 2017-05-15 PROCEDURE — 97810 ACUP 1/> WO ESTIM 1ST 15 MIN: CPT | Mod: GA | Performed by: CHIROPRACTOR

## 2017-05-15 NOTE — PROGRESS NOTES
VISIT #:9/2017 Chronic pain- R shoulder/paresthesias    SUBJECTIVE:   The pt reports at least 75% less pain in the R shoulder area post treatment. She continues progressing from this episode. This weekend she noted an increase in R cervical pain with pain in the R thoracic paraspinal and R posterior shoulder blade. She denies significant pain in the R arm/hand which she is appreciative of. She also reported HA sx. The most irritation she feels is located in the R thoracic paraspinal area. She denies weakness or other unusual sx.       OBJECTIVE: C2, C3 right, T9, T10 suboccipitals, R SCM ( lesser suboccipital nerve) R levator scapula, B QL, R gluteus medius hypertonicity with tenderness to palpation.     ASSESSMENT/PLAN : treatment for chronic pain when exacerbation occurs.    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 Reduction of therapy when pt has reached 80-85% imporovement    Diagnoses (See diagnoses listed above in encounter notes.)    Response to previous treatment:  Pt responding well to therapy       Chiropractic manipulation: C2, C3, T10,T12 Occiput right activator and manual adjustment  Extraspinal adjustment: NONE TODAY      Modalities: ACP points to the R R/C and cervical musculature, R arm and hand. R thoracic paraspinals 15 minutes prone       Therapeutic exercise: none     FU: 2-3 weeks  ACP   Reduction of care if no change      Chronic pain. pt stable   Continue with PT as recommended

## 2017-05-15 NOTE — MR AVS SNAPSHOT
After Visit Summary   5/15/2017    Sulema Murphy    MRN: [id number]           Patient Information     Date Of Birth          1960        Visit Information        Provider Department      5/15/2017 9:00 AM Suzi Whittington [facility]        Today's Diagnoses     Cervicobrachial neuralgia    -  1    Thoracic segment dysfunction        Head region somatic dysfunction        Somatic dysfunction of sacral region        Chronic right shoulder pain        Chronic tension-type headache, intractable        Cervicalgia           Follow-ups after your visit        Your next 10 appointments already scheduled     Jun 02, 2017  7:30 AM CDT   [facility] with Suzi Whittington DC   [facility] - [place] [place] [facility] ([facility] [place])    [address] [place] [address]  [id number]  [place] [address] [phone]              Who to contact     If you have questions or need follow up information about today's clinic visit or your schedule please contact [facility] directly at [phone].  Normal or non-critical lab and imaging results will be communicated to you by MyChart, letter or phone within 4 business days after the clinic has received the results. If you do not hear from us within 7 days, please contact the clinic through FriendFithart or phone. If you have a critical or abnormal lab result, we will notify you by phone as soon as possible.  Submit refill requests through Retail Info or call your pharmacy and they will forward the refill request to us. Please allow 3 business days for your refill to be completed.          Additional Information About Your Visit        MyChart Information     Retail Info gives you secure access to your electronic health record. If you see a primary care provider, you can also send messages to your care team and make appointments. If you have questions, please call your primary care clinic.  If you do not have a primary care provider,  please call [phone] and they will assist you.        Care EveryWhere ID     This is your Care EveryWhere ID. This could be used by other organizations to access your [facility] medical records  [id number]         Blood Pressure from Last 3 Encounters:   02/22/16 114/78   08/14/13 112/62   05/14/15 121/79    Weight from Last 3 Encounters:   02/22/16 53.5 kg (118 lb)   08/14/13 54.4 kg (120 lb)   05/14/15 54.9 kg (121 lb)              We Performed the Following     ACUPUNCTURE, 1+ NEEDLES, W/O ELECTRICAL STIM; INIT 15 MIN PERSONAL CONTACT     CHIROPRAC MANIP,SPINAL,3-4 REGIONS        Primary Care Provider Office Phone # Fax #    [facility] [phone]       [address] [place] [address], [address]  [place] [address]        Thank you!     Thank you for choosing [facility]  for your care. Our goal is always to provide you with excellent care. Hearing back from our patients is one way we can continue to improve our services. Please take a few minutes to complete the written survey that you may receive in the mail after your visit with us. Thank you!             Your Updated Medication List - Protect others around you: Learn how to safely use, store and throw away your medicines at www.disposemymeds.org.          This list is accurate as of: 5/15/17  9:50 AM.  Always use your most recent med list.                   Brand Name Dispense Instructions for use    CALCIUM PO      Take  by mouth.       cycloSPORINE 0.05 % ophthalmic emulsion    RESTASIS     1 drop every 12 hours.       GLUCOSAMINE-CHONDROITIN PO      Take  by mouth.       METROLOTION 0.75 % Lotn   Generic drug:  metroNIDAZOLE      Apply thin layer to the affected area by topical route 2 times per day in the morning and evening       MULTI-VITAMINS PO      Take  by mouth.       SYNTHROID 137 MCG tablet   Generic drug:  levothyroxine     3 MONTHS    ONE TABLET DAILY IN THE MORNING

## 2017-06-02 ENCOUNTER — THERAPY VISIT (OUTPATIENT)
Dept: CHIROPRACTIC MEDICINE | Facility: CLINIC | Age: 57
End: 2017-06-02
Payer: COMMERCIAL

## 2017-06-02 DIAGNOSIS — G44.221 CHRONIC TENSION-TYPE HEADACHE, INTRACTABLE: ICD-10-CM

## 2017-06-02 DIAGNOSIS — M54.2 CERVICALGIA: ICD-10-CM

## 2017-06-02 DIAGNOSIS — M99.04 SOMATIC DYSFUNCTION OF SACRAL REGION: ICD-10-CM

## 2017-06-02 DIAGNOSIS — M54.12 CERVICOBRACHIAL NEURALGIA: Primary | ICD-10-CM

## 2017-06-02 DIAGNOSIS — M25.511 CHRONIC RIGHT SHOULDER PAIN: ICD-10-CM

## 2017-06-02 DIAGNOSIS — M99.00 HEAD REGION SOMATIC DYSFUNCTION: ICD-10-CM

## 2017-06-02 DIAGNOSIS — M54.50 LUMBAGO: ICD-10-CM

## 2017-06-02 DIAGNOSIS — M99.02 THORACIC SEGMENT DYSFUNCTION: ICD-10-CM

## 2017-06-02 DIAGNOSIS — G89.29 CHRONIC RIGHT SHOULDER PAIN: ICD-10-CM

## 2017-06-02 PROCEDURE — 98941 CHIROPRACT MANJ 3-4 REGIONS: CPT | Mod: AT | Performed by: CHIROPRACTOR

## 2017-06-02 PROCEDURE — 97810 ACUP 1/> WO ESTIM 1ST 15 MIN: CPT | Mod: GA | Performed by: CHIROPRACTOR

## 2017-06-02 NOTE — MR AVS SNAPSHOT
After Visit Summary   6/2/2017    Sulema Murphy    MRN: [id number]           Patient Information     Date Of Birth          1960        Visit Information        Provider Department      6/2/2017 7:30 AM Suzi Whittington DC [facility] - [facility] [place] [facility]        Today's Diagnoses     Cervicobrachial neuralgia    -  1    Thoracic segment dysfunction        Head region somatic dysfunction        Somatic dysfunction of sacral region        Chronic right shoulder pain        Chronic tension-type headache, intractable        Cervicalgia        Lumbago           Follow-ups after your visit        Who to contact     If you have questions or need follow up information about today's clinic visit or your schedule please contact [facility] directly at [phone].  Normal or non-critical lab and imaging results will be communicated to you by MyChart, letter or phone within 4 business days after the clinic has received the results. If you do not hear from us within 7 days, please contact the clinic through PrintToPeerhart or phone. If you have a critical or abnormal lab result, we will notify you by phone as soon as possible.  Submit refill requests through Myreks or call your pharmacy and they will forward the refill request to us. Please allow 3 business days for your refill to be completed.          Additional Information About Your Visit        MyChart Information     Myreks gives you secure access to your electronic health record. If you see a primary care provider, you can also send messages to your care team and make appointments. If you have questions, please call your primary care clinic.  If you do not have a primary care provider, please call [phone] and they will assist you.        Care EveryWhere ID     This is your Care EveryWhere ID. This could be used by other organizations to access your [facility]  records  [id number]         Blood Pressure from Last 3 Encounters:   02/22/16 114/78   08/14/13 112/62   05/14/15 121/79    Weight from Last 3 Encounters:   02/22/16 53.5 kg (118 lb)   08/14/13 54.4 kg (120 lb)   05/14/15 54.9 kg (121 lb)              We Performed the Following     ACUPUNCTURE, 1+ NEEDLES, W/O ELECTRICAL STIM; INIT 15 MIN PERSONAL CONTACT     CHIROPRAC MANIP,SPINAL,3-4 REGIONS        Primary Care Provider Office Phone # Fax #    [facility] [phone]       [address] [place] [address], [address]  [facility] [address]        Thank you!     Thank you for choosing [facility]  [facility] your [facility]. Our goal is always to provide you with excellent care. Hearing back from our patients is one way we can continue to improve our services. Please take a few minutes to complete the written survey that you may receive in the mail after your visit with us. Thank you!             Your Updated Medication List - Protect others around you: Learn how to safely use, store and throw away your medicines at www.disposemymeds.org.          This list is accurate as of: 6/2/17 10:45 AM.  Always use your most recent med list.                   Brand Name Dispense Instructions for use    CALCIUM PO      Take  by mouth.       cycloSPORINE 0.05 % ophthalmic emulsion    RESTASIS     1 drop every 12 hours.       GLUCOSAMINE-CHONDROITIN PO      Take  by mouth.       METROLOTION 0.75 % Lotn   Generic drug:  metroNIDAZOLE      Apply thin layer to the affected area by topical route 2 times per day in the morning and evening       MULTI-VITAMINS PO      Take  by mouth.       SYNTHROID 137 MCG tablet   Generic drug:  levothyroxine     3 MONTHS    ONE TABLET DAILY IN THE MORNING

## 2017-06-02 NOTE — PROGRESS NOTES
VISIT #:10/2017 Chronic pain- R shoulder/paresthesias    SUBJECTIVE:   The pt reports at least 75%-80% subjective improvement. She notes continued improvement from the treatment. She went golfing over the past weekend and reported some stiffness in the R shoulder and some mild tingling in the R hand however the px was not severe. The pt states she is more stable. She noted 2 HA since the previous treatment. Today she reports mild radiation of pain in the L hip and upper thigh with tension in the L hip area. The pt denies weakness or other unusual sx.       OBJECTIVE: C2, C3 right, T6, L PSIS   suboccipitals, R SCM ( lesser suboccipital nerve) R levator scapula, B QL, L  gluteus medius hypertonicity with tenderness to palpation.     ASSESSMENT/PLAN : treatment for chronic pain when exacerbation occurs.    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 Reduction of therapy when pt has reached 80-85% imporovement    Diagnoses (See diagnoses listed above in encounter notes.)    Response to previous treatment:  Pt responding well to therapy and has now reached MMI from this episode        Chiropractic manipulation: C2, C3, T7, L PSIS, sacrum    Extraspinal adjustment: NONE TODAY      Modalities: ACP points to the R R/C and cervical musculature, R arm and hand. R thoracic paraspinals 15 minutes prone       Therapeutic exercise: none     FU: is sx persist  Pt released from this episode and should FU if sx increase    Chronic pain. pt stable   Continue with PT as recommended

## 2017-06-24 ENCOUNTER — HEALTH MAINTENANCE LETTER (OUTPATIENT)
Age: 57
End: 2017-06-24

## 2017-09-21 ENCOUNTER — HOSPITAL ENCOUNTER (OUTPATIENT)
Dept: MAMMOGRAPHY | Facility: CLINIC | Age: 57
Discharge: HOME | End: 2017-09-21
Attending: OBSTETRICS & GYNECOLOGY | Admitting: OBSTETRICS & GYNECOLOGY
Payer: COMMERCIAL

## 2017-09-21 DIAGNOSIS — Z12.31 VISIT FOR SCREENING MAMMOGRAM: ICD-10-CM

## 2017-09-21 PROCEDURE — 77063 BREAST TOMOSYNTHESIS BI: CPT

## 2017-09-21 PROCEDURE — G0202 SCR MAMMO BI INCL CAD: HCPCS

## 2017-10-11 ENCOUNTER — THERAPY VISIT (OUTPATIENT)
Dept: CHIROPRACTIC MEDICINE | Facility: CLINIC | Age: 57
End: 2017-10-11
Payer: COMMERCIAL

## 2017-10-11 DIAGNOSIS — G44.221 CHRONIC TENSION-TYPE HEADACHE, INTRACTABLE: ICD-10-CM

## 2017-10-11 DIAGNOSIS — M54.6 ACUTE BILATERAL THORACIC BACK PAIN: ICD-10-CM

## 2017-10-11 DIAGNOSIS — M25.511 CHRONIC RIGHT SHOULDER PAIN: ICD-10-CM

## 2017-10-11 DIAGNOSIS — M99.00 HEAD REGION SOMATIC DYSFUNCTION: ICD-10-CM

## 2017-10-11 DIAGNOSIS — M99.02 THORACIC SEGMENT DYSFUNCTION: Primary | ICD-10-CM

## 2017-10-11 DIAGNOSIS — M54.12 CERVICOBRACHIAL NEURALGIA: ICD-10-CM

## 2017-10-11 DIAGNOSIS — G89.29 CHRONIC RIGHT SHOULDER PAIN: ICD-10-CM

## 2017-10-11 DIAGNOSIS — M54.2 CERVICALGIA: ICD-10-CM

## 2017-10-11 PROCEDURE — 98940 CHIROPRACT MANJ 1-2 REGIONS: CPT | Mod: AT | Performed by: CHIROPRACTOR

## 2017-10-11 NOTE — PROGRESS NOTES
VISIT #:11/2017 Chronic pain- R shoulder/paresthesias    SUBJECTIVE:   The pt reports moderate mid back pain perhaps from sleeping in an awkward position. The pain is bilateral and she is having pain twisting and bending. Pain is graded a 4-5/10 on VAS and seems to be affecting her digestion somewhat. She notes tension in the upper cervical region and difficulty turning the head in either direction. The pt denies weakness in the extremities or other unusual sx.       OBJECTIVE: C2, C3 right, T5,  T8, T9   suboccipitals, R SCM ( lesser suboccipital nerve), R LR levator scapula, B QL, L  gluteus medius hypertonicity with tenderness to palpation.     ASSESSMENT/PLAN : treatment for chronic pain when exacerbation occurs.    Exacerbation/regression    Diagnoses (See diagnoses listed above in encounter notes.)    Response to previous treatment:  Pt responding well to therapy and has now reached MMI from this episode        Chiropractic manipulation: C2, C3, T5, T7, T9  Extraspinal adjustment: NONE TODAY      Modalities: ACP points to the R R/C and cervical musculature, R arm and hand. R thoracic paraspinals 15 minutes prone       Therapeutic exercise: none     FU: is sx persist  Pt released from this episode and should FU if sx increase    Chronic pain. pt stable   Continue with PT as recommended

## 2017-10-11 NOTE — MR AVS SNAPSHOT
After Visit Summary   10/11/2017    Sulema Murphy    MRN: [id number]           Patient Information     Date Of Birth          1960        Visit Information        Provider Department      10/11/2017 7:30 AM Suzi Whittington DC [facility] - [place] [facility]        Today's Diagnoses     Thoracic segment dysfunction    -  1    Cervicobrachial neuralgia        Head region somatic dysfunction        Chronic right shoulder pain        Chronic tension-type headache, intractable        Cervicalgia        Acute bilateral thoracic back pain           Follow-ups after your visit        Who to contact     If you have questions or need follow up information about today's clinic visit or your schedule please contact [place] [facility] [place] [facility] directly at [phone].  Normal or non-critical lab and imaging results will be communicated to you by Alarishart, letter or phone within 4 business days after the clinic has received the results. If you do not hear from us within 7 days, please contact the clinic through Alarishart or phone. If you have a critical or abnormal lab result, we will notify you by phone as soon as possible.  Submit refill requests through Teneros or call your pharmacy and they will forward the refill request to us. Please allow 3 business days for your refill to be completed.          Additional Information About Your Visit        MyChart Information     Teneros gives you secure access to your electronic health record. If you see a primary care provider, you can also send messages to your care team and make appointments. If you have questions, please call your primary care clinic.  If you do not have a primary care provider, please call [phone] and they will assist you.        Care EveryWhere ID     This is your Care EveryWhere ID. This could be used by other organizations to access your [facility]  records  [id number]         Blood Pressure from Last 3 Encounters:   02/22/16 114/78   08/14/13 112/62   05/14/15 121/79    Weight from Last 3 Encounters:   02/22/16 53.5 kg (118 lb)   08/14/13 54.4 kg (120 lb)   05/14/15 54.9 kg (121 lb)              We Performed the Following     CHIROPRAC MANIP,SPINAL,1-2 REGIONS        Primary Care Provider Office Phone # Fax #    [facility] [phone]       [facility], [address]  [place] [address]        Equal Access to Services     [facility] : Hadii ina Sears, waaxda sherrill, qaybta kaalmada fercho, mague rene. So [facility] [phone].    ATENCIÓN: Si habla español, tiene a julien disposición servicios gratuitos de asistencia lingüística. [facility] [phone].    We comply with applicable federal civil rights laws and [place] laws. We do not discriminate on the basis of race, color, national origin, age, disability, sex, sexual orientation, or gender identity.            Thank you!     Thank you for choosing [facility] CHIROPRACTOR  for your care. Our goal is always to provide you with excellent care. Hearing back from our patients is one way we can continue to improve our services. Please take a few minutes to complete the written survey that you may receive in the mail after your visit with us. Thank you!             Your Updated Medication List - Protect others around you: Learn how to safely use, store and throw away your medicines at www.disposemymeds.org.          This list is accurate as of: 10/11/17  8:01 AM.  Always use your most recent med list.                   Brand Name Dispense Instructions for use Diagnosis    CALCIUM PO      Take  by mouth.        cycloSPORINE 0.05 % ophthalmic emulsion    RESTASIS     1 drop every 12 hours.        GLUCOSAMINE-CHONDROITIN PO      Take  by mouth.        METROLOTION 0.75 % Lotn   Generic drug:   metroNIDAZOLE      Apply thin layer to the affected area by topical route 2 times per day in the morning and evening        MULTI-VITAMINS PO      Take  by mouth.        SYNTHROID 137 MCG tablet   Generic drug:  levothyroxine     3 MONTHS    ONE TABLET DAILY IN THE MORNING    Pain in limb, Plantar fascial fibromatosis

## 2017-11-03 ENCOUNTER — THERAPY VISIT (OUTPATIENT)
Dept: CHIROPRACTIC MEDICINE | Facility: CLINIC | Age: 57
End: 2017-11-03
Payer: COMMERCIAL

## 2017-11-03 DIAGNOSIS — M54.2 CERVICALGIA: ICD-10-CM

## 2017-11-03 DIAGNOSIS — M99.02 THORACIC SEGMENT DYSFUNCTION: ICD-10-CM

## 2017-11-03 DIAGNOSIS — M25.511 CHRONIC RIGHT SHOULDER PAIN: ICD-10-CM

## 2017-11-03 DIAGNOSIS — M99.00 HEAD REGION SOMATIC DYSFUNCTION: ICD-10-CM

## 2017-11-03 DIAGNOSIS — M99.04 SOMATIC DYSFUNCTION OF SACRAL REGION: ICD-10-CM

## 2017-11-03 DIAGNOSIS — G44.221 CHRONIC TENSION-TYPE HEADACHE, INTRACTABLE: ICD-10-CM

## 2017-11-03 DIAGNOSIS — G89.29 CHRONIC RIGHT SHOULDER PAIN: ICD-10-CM

## 2017-11-03 DIAGNOSIS — M54.50 CHRONIC LOW BACK PAIN WITHOUT SCIATICA, UNSPECIFIED BACK PAIN LATERALITY: ICD-10-CM

## 2017-11-03 DIAGNOSIS — G89.29 CHRONIC LOW BACK PAIN WITHOUT SCIATICA, UNSPECIFIED BACK PAIN LATERALITY: ICD-10-CM

## 2017-11-03 DIAGNOSIS — M54.12 CERVICOBRACHIAL NEURALGIA: Primary | ICD-10-CM

## 2017-11-03 PROCEDURE — 97035 APP MDLTY 1+ULTRASOUND EA 15: CPT | Performed by: CHIROPRACTOR

## 2017-11-03 PROCEDURE — 98940 CHIROPRACT MANJ 1-2 REGIONS: CPT | Mod: AT | Performed by: CHIROPRACTOR

## 2017-11-03 NOTE — PROGRESS NOTES
VISIT #:12/2017 Chronic pain- R shoulder/paresthesias    SUBJECTIVE:   The pt returns with an increase in R sided mid back and R arm pain and mid thoracic pain radiating in the R anterior chest area. She went to her PCP who took blood tests to assess the viscera. She states pain levels were moderately high and she almost went to the ER. Reaching and twisting will increase the pain in the R arm and thorax. Sleeping will increase the pain.  The pt denies weakness in the extremities or other unusual sx.     OBJECTIVE: C2, C3 right, T5,  T8, T9   suboccipitals, R SCM ( lesser suboccipital nerve), R LR levator scapula, B QL, L  gluteus medius hypertonicity with tenderness to palpation.     ASSESSMENT/PLAN : treatment for chronic pain when exacerbation occurs.    Exacerbation/regression    Diagnoses (See diagnoses listed above in encounter notes.)    Response to previous treatment:  Pt responding well to therapy and has now reached MMI from this episode        Chiropractic manipulation: C2, C3, T5, T7, T9  Extraspinal adjustment: NONE TODAY      Modalities: US therapy to the R teres major/minor and R thoracic paraspinals.     Therapeutic exercise: 1st rib stretching for 3 minutes-PNF stretching     FU: 1 week with US therapy    Chronic pain. pt stable   Continue with PT as recommended

## 2017-11-03 NOTE — MR AVS SNAPSHOT
After Visit Summary   11/3/2017    Sulema Murphy    MRN: [id number]           Patient Information     Date Of Birth          1960        Visit Information        Provider Department      11/3/2017 7:30 AM Suzi Whittington DC [facility] - [place] [facility]        Today's Diagnoses     Cervicobrachial neuralgia    -  1    Thoracic segment dysfunction        Head region somatic dysfunction        Somatic dysfunction of sacral region        Chronic right shoulder pain        Chronic tension-type headache, intractable        Cervicalgia        Chronic low back pain without sciatica, unspecified back pain laterality           Follow-ups after your visit        Your next 10 appointments already scheduled     Nov 15, 2017  3:45 PM CST   Chiro Acupuncture Follow Up with Suzi Whittington DC   [facility] [place] [facility] ([facility] [place])    [address] [place] [address]  [address]  [place] [address] [phone]              Who to contact     If you have questions or need follow up information about today's clinic visit or your schedule please contact [facility] [place] [facility]RA[facility] directly at [phone].  Normal or non-critical lab and imaging results will be communicated to you by MyChart, letter or phone within 4 business days after the clinic has received the results. If you do not hear from us within 7 days, please contact the clinic through vufindhart or phone. If you have a critical or abnormal lab result, we will notify you by phone as soon as possible.  Submit refill requests through [employer] or call your pharmacy and they will forward the refill request to us. Please allow 3 business days for your refill to be completed.          Additional Information About Your Visit        MyChart Information     [employer] gives you secure access to your electronic health record. If you see a primary care  provider, you can also send messages to your care team and make appointments. If you have questions, please call your primary care clinic.  If you do not have a primary care provider, please call [phone] and they will assist you.        Care EveryWhere ID     This is your Care EveryWhere ID. This could be used by other organizations to access your [place] medical records  [id number]         Blood Pressure from Last 3 Encounters:   02/22/16 114/78   08/14/13 112/62   05/14/15 121/79    Weight from Last 3 Encounters:   02/22/16 53.5 kg (118 lb)   08/14/13 54.4 kg (120 lb)   05/14/15 54.9 kg (121 lb)              We Performed the Following     CHIROPRAC MANIP,SPINAL,1-2 REGIONS     ULTRASOUND THERAPY        Primary Care Provider Office Phone # Fax #    [facility] [phone]       [address] [place] [address], [address]  [place] [address]        Equal Access to Services     JOHANN BROUSSARD : Hadii aad ku hadasho Soomaali, waaxda luqadaha, qaybta kaalmada adeegyada, waxay ernestoin hayberonica martin . So [facility] [phone].    ATENCIÓN: Si habla español, tiene a julien disposición servicios gratuitos de asistencia lingüística. [facility] [phone].    We comply with applicable federal civil rights laws and [place] laws. We do not discriminate on the basis of race, color, national origin, age, disability, sex, sexual orientation, or gender identity.            Thank you!     Thank you for choosing [facility]  for your care. Our goal is always to provide you with excellent care. Hearing back from our patients is one way we can continue to improve our services. Please take a few minutes to complete the written survey that you may receive in the mail after your visit with us. Thank you!             Your Updated Medication List - Protect others around you: Learn how to safely use, store and throw away your medicines at  www.disposemymeds.org.          This list is accurate as of: 11/3/17  9:52 AM.  Always use your most recent med list.                   Brand Name Dispense Instructions for use Diagnosis    CALCIUM PO      Take  by mouth.        cycloSPORINE 0.05 % ophthalmic emulsion    RESTASIS     1 drop every 12 hours.        GLUCOSAMINE-CHONDROITIN PO      Take  by mouth.        METROLOTION 0.75 % Lotn   Generic drug:  metroNIDAZOLE      Apply thin layer to the affected area by topical route 2 times per day in the morning and evening        MULTI-VITAMINS PO      Take  by mouth.        SYNTHROID 137 MCG tablet   Generic drug:  levothyroxine     3 MONTHS    ONE TABLET DAILY IN THE MORNING    Pain in limb, Plantar fascial fibromatosis

## 2017-11-15 ENCOUNTER — THERAPY VISIT (OUTPATIENT)
Dept: CHIROPRACTIC MEDICINE | Facility: CLINIC | Age: 57
End: 2017-11-15
Payer: COMMERCIAL

## 2017-11-15 DIAGNOSIS — G89.29 CHRONIC RIGHT SHOULDER PAIN: ICD-10-CM

## 2017-11-15 DIAGNOSIS — M54.12 CERVICOBRACHIAL NEURALGIA: Primary | ICD-10-CM

## 2017-11-15 DIAGNOSIS — M99.02 THORACIC SEGMENT DYSFUNCTION: ICD-10-CM

## 2017-11-15 DIAGNOSIS — M54.2 CERVICALGIA: ICD-10-CM

## 2017-11-15 DIAGNOSIS — M99.00 HEAD REGION SOMATIC DYSFUNCTION: ICD-10-CM

## 2017-11-15 DIAGNOSIS — G44.221 CHRONIC TENSION-TYPE HEADACHE, INTRACTABLE: ICD-10-CM

## 2017-11-15 DIAGNOSIS — M25.511 CHRONIC RIGHT SHOULDER PAIN: ICD-10-CM

## 2017-11-15 PROCEDURE — 97810 ACUP 1/> WO ESTIM 1ST 15 MIN: CPT | Mod: GA | Performed by: CHIROPRACTOR

## 2017-11-15 PROCEDURE — 98940 CHIROPRACT MANJ 1-2 REGIONS: CPT | Mod: AT | Performed by: CHIROPRACTOR

## 2017-11-15 NOTE — PROGRESS NOTES
VISIT #:13/2017 Chronic pain- R shoulder/paresthesias    SUBJECTIVE:   The pt returns with continued pain in the R arm area with radiation from the mid back to the R anterior chest area. She is 10% improved from the previous treatment. The pt denies HA sx. She is having difficulty sleeping on the R side due to pain. The L thumb is numb and sore. The pt denies weakness in the extremities or other unusual sx.     OBJECTIVE: C2, C3 right, T5,  T8, T9   suboccipitals, R SCM ( lesser suboccipital nerve), R LR levator scapula, B QL, L  gluteus medius hypertonicity with tenderness to palpation.     ASSESSMENT/PLAN : treatment for chronic pain when exacerbation occurs.    Exacerbation/regression    Diagnoses (See diagnoses listed above in encounter notes.)    Response to previous treatment:  Pt responding well to therapy and has now reached MMI from this episode        Chiropractic manipulation: C2, C3, T5, T7, T9  Extraspinal adjustment: NONE TODAY      Modalities: Acupuncture: points to the R shoulder and elbow. Ismael points to the cervical spine 15 minutes prone.   Therapeutic exercise: 1st rib stretching for 3 minutes-PNF stretching     FU: 1 week with US therapy    Chronic pain. pt stable   Continue with PT as recommended

## 2017-11-15 NOTE — MR AVS SNAPSHOT
After Visit Summary   11/15/2017    Sulema Murphy    MRN: [id number]           Patient Information     Date Of Birth          1960        Visit Information        Provider Department      11/15/2017 3:45 PM Suzi Whittington DC [facility] [place] [facility]        Today's Diagnoses     Cervicobrachial neuralgia    -  1    Thoracic segment dysfunction        Head region somatic dysfunction        Chronic right shoulder pain        Chronic tension-type headache, intractable        Cervicalgia           Follow-ups after your visit        Your next 10 appointments already scheduled     Nov 22, 2017  4:15 PM CST   Chiro Acupuncture Follow Up with Suzi Whittington DC   [facility] [place] [facility]rop[facility] ([facility] [place])    [address] [place] [address]  [address]  [place] [address] [phone]              Who to contact     If you have questions or need follow up information about today's clinic visit or your schedule please contact [facility] [place] CHIROPRACTOR directly at [phone].  Normal or non-critical lab and imaging results will be communicated to you by excentoshart, letter or phone within 4 business days after the clinic has received the results. If you do not hear from us within 7 days, please contact the clinic through HealthUnityt or phone. If you have a critical or abnormal lab result, we will notify you by phone as soon as possible.  Submit refill requests through [employer] or call your pharmacy and they will forward the refill request to us. Please allow 3 business days for your refill to be completed.          Additional Information About Your Visit        excentoshart Information     [employer] gives you secure access to your electronic health record. If you see a primary care provider, you can also send messages to your care team and make appointments. If you have questions, please call your primary  care clinic.  If you do not have a primary care provider, please call [phone] and they will assist you.        Care EveryWhere ID     This is your Care EveryWhere ID. This could be used by other organizations to access your [place] medical records  [id number]         Blood Pressure from Last 3 Encounters:   02/22/16 114/78   08/14/13 112/62   05/14/15 121/79    Weight from Last 3 Encounters:   02/22/16 53.5 kg (118 lb)   08/14/13 54.4 kg (120 lb)   05/14/15 54.9 kg (121 lb)              We Performed the Following     ACUPUNCTURE, 1+ NEEDLES, W/O ELECTRICAL STIM; INIT 15 MIN PERSONAL CONTACT     CHIROPRAC MANIP,SPINAL,1-2 REGIONS        Primary Care Provider Office Phone # Fax #    [facility] [phone]       [address] [place] [address], [address]  [facility] [address]        Equal Access to Services     JOHANN BROUSSARD : Hadii ina ku hadasho Soomaali, waaxda luqadaha, qaybta kaalmada adeegyada, mague rene. So [facility] [phone].    ATENCIÓN: Si isa rios, tiene a julien disposición servicios gratuitos de asistencia lingüística. Llame al [phone].    We comply with applicable federal civil rights laws and [place] laws. We do not discriminate on the basis of race, color, national origin, age, disability, sex, sexual orientation, or gender identity.            Thank you!     Thank you for choosing [facility] CHIROPRACTOR  for your care. Our goal is always to provide you with excellent care. Hearing back from our patients is one way we can continue to improve our services. Please take a few minutes to complete the written survey that you may receive in the mail after your visit with us. Thank you!             Your Updated Medication List - Protect others around you: Learn how to safely use, store and throw away your medicines at www.disposemymeds.org.          This list is accurate as of: 11/15/17  5:05 PM.   Always use your most recent med list.                   Brand Name Dispense Instructions for use Diagnosis    CALCIUM PO      Take  by mouth.        cycloSPORINE 0.05 % ophthalmic emulsion    RESTASIS     1 drop every 12 hours.        GLUCOSAMINE-CHONDROITIN PO      Take  by mouth.        METROLOTION 0.75 % Lotn   Generic drug:  metroNIDAZOLE      Apply thin layer to the affected area by topical route 2 times per day in the morning and evening        MULTI-VITAMINS PO      Take  by mouth.        SYNTHROID 137 MCG tablet   Generic drug:  levothyroxine     3 MONTHS    ONE TABLET DAILY IN THE MORNING    Pain in limb, Plantar fascial fibromatosis

## 2017-11-29 ENCOUNTER — THERAPY VISIT (OUTPATIENT)
Dept: CHIROPRACTIC MEDICINE | Facility: CLINIC | Age: 57
End: 2017-11-29
Payer: COMMERCIAL

## 2017-11-29 DIAGNOSIS — G89.29 CHRONIC LOW BACK PAIN WITHOUT SCIATICA, UNSPECIFIED BACK PAIN LATERALITY: ICD-10-CM

## 2017-11-29 DIAGNOSIS — M99.04 SOMATIC DYSFUNCTION OF SACRAL REGION: ICD-10-CM

## 2017-11-29 DIAGNOSIS — M99.02 THORACIC SEGMENT DYSFUNCTION: ICD-10-CM

## 2017-11-29 DIAGNOSIS — M54.12 CERVICOBRACHIAL NEURALGIA: Primary | ICD-10-CM

## 2017-11-29 DIAGNOSIS — M54.2 CERVICALGIA: ICD-10-CM

## 2017-11-29 DIAGNOSIS — G89.29 CHRONIC RIGHT SHOULDER PAIN: ICD-10-CM

## 2017-11-29 DIAGNOSIS — G44.221 CHRONIC TENSION-TYPE HEADACHE, INTRACTABLE: ICD-10-CM

## 2017-11-29 DIAGNOSIS — M25.511 CHRONIC RIGHT SHOULDER PAIN: ICD-10-CM

## 2017-11-29 DIAGNOSIS — M99.00 HEAD REGION SOMATIC DYSFUNCTION: ICD-10-CM

## 2017-11-29 DIAGNOSIS — M54.50 CHRONIC LOW BACK PAIN WITHOUT SCIATICA, UNSPECIFIED BACK PAIN LATERALITY: ICD-10-CM

## 2017-11-29 PROCEDURE — 97810 ACUP 1/> WO ESTIM 1ST 15 MIN: CPT | Mod: GA | Performed by: CHIROPRACTOR

## 2017-11-29 PROCEDURE — 98940 CHIROPRACT MANJ 1-2 REGIONS: CPT | Mod: AT | Performed by: CHIROPRACTOR

## 2017-11-29 NOTE — MR AVS SNAPSHOT
After Visit Summary   11/29/2017    Sulema Murphy    MRN: [id number]           Patient Information     Date Of Birth          1960        Visit Information        Provider Department      11/29/2017 4:00 PM Suzi Whittington DC [facility] [place] [facility]        Today's Diagnoses     Cervicobrachial neuralgia    -  1    Thoracic segment dysfunction        Head region somatic dysfunction        Somatic dysfunction of sacral region        Chronic right shoulder pain        Chronic tension-type headache, intractable        Cervicalgia        Chronic low back pain without sciatica, unspecified back pain laterality           Follow-ups after your visit        Your next 10 appointments already scheduled     Dec 08, 2017  7:30 AM CST   Chiro Acupuncture Follow Up with Suzi Whittington DC   [facility] [place] [facility]rop[facility] ([facility] [place])    [address] [place] [address]  [address]  [place] [address]   [phone]            Dec 20, 2017  3:30 PM CST   Chiro Acupuncture Follow Up with Suzi Whittington DC   [facility] [place] Chiropractor ([facility] [place])    [address] [place] [address]  [address]  [place] [address]   [phone]              Who to contact     If you have questions or need follow up information about today's clinic visit or your schedule please contact [facility] directly at [phone].  Normal or non-critical lab and imaging results will be communicated to you by MyChart, letter or phone within 4 business days after the clinic has received the results. If you do not hear from us within 7 days, please contact the clinic through SoPosthart or phone. If you have a critical or abnormal lab result, we will notify you by phone as soon as possible.  Submit refill requests through [email or web address] or call your pharmacy and they will forward the refill  request to us. Please allow 3 business days for your refill to be completed.          Additional Information About Your Visit        MyChart Information     Pinnattahart gives you secure access to your electronic health record. If you see a primary care provider, you can also send messages to your care team and make appointments. If you have questions, please call your primary care clinic.  If you do not have a primary care provider, please call [phone] and they will assist you.        Care EveryWhere ID     This is your Care EveryWhere ID. This could be used by other organizations to access your [place] medical records  [id number]         Blood Pressure from Last 3 Encounters:   02/22/16 114/78   08/14/13 112/62   05/14/15 121/79    Weight from Last 3 Encounters:   02/22/16 53.5 kg (118 lb)   08/14/13 54.4 kg (120 lb)   05/14/15 54.9 kg (121 lb)              We Performed the Following     ACUPUNCTURE, 1+ NEEDLES, W/O ELECTRICAL STIM; INIT 15 MIN PERSONAL CONTACT     CHIROPRAC MANIP,SPINAL,1-2 REGIONS        Primary Care Provider Office Phone # Fax #    [facility] [phone]       [address] [place] [address], [address]  [facility] [address]        Equal Access to Services     JOHANN BROUSSARD : Hadii ina ku hadasho Soomaali, waaxda luqadaha, qaybta kaalmada adeegyada, mague rene. So [facility] [phone].    ATENCIÓN: Si habla español, tiene a julien disposición servicios gratuitos de asistencia lingüística. Llame al [phone].    We comply with applicable federal civil rights laws and [place] laws. We do not discriminate on the basis of race, color, national origin, age, disability, sex, sexual orientation, or gender identity.            Thank you!     Thank you for choosing [facility]  for your care. Our goal is always to provide you with excellent care. Hearing back from our patients is one way we can  continue to improve our services. Please take a few minutes to complete the written survey that you may receive in the mail after your visit with us. Thank you!             Your Updated Medication List - Protect others around you: Learn how to safely use, store and throw away your medicines at www.disposemymeds.org.          This list is accurate as of: 11/29/17 11:59 PM.  Always use your most recent med list.                   Brand Name Dispense Instructions for use Diagnosis    CALCIUM PO      Take  by mouth.        cycloSPORINE 0.05 % ophthalmic emulsion    RESTASIS     1 drop every 12 hours.        GLUCOSAMINE-CHONDROITIN PO      Take  by mouth.        METROLOTION 0.75 % Lotn   Generic drug:  metroNIDAZOLE      Apply thin layer to the affected area by topical route 2 times per day in the morning and evening        MULTI-VITAMINS PO      Take  by mouth.        SYNTHROID 137 MCG tablet   Generic drug:  levothyroxine     3 MONTHS    ONE TABLET DAILY IN THE MORNING    Pain in limb, Plantar fascial fibromatosis

## 2017-11-30 NOTE — PROGRESS NOTES
VISIT #:14/2017 Chronic pain- R shoulder/paresthesias    SUBJECTIVE:   The pt returns with 50% subjective improvement since initial presentation. She notes less pain in the R arm and ability to sleep longer without waking. Lifting her R arm above her head will irritate the L mid back area. She notes R sided neck pain. The pt denies weakness or other unusual sx.     OBJECTIVE: C2, C3 right, T5,  T8, T9   suboccipitals, R SCM ( lesser suboccipital nerve), R LR levator scapula, B QL, L  gluteus medius hypertonicity with tenderness to palpation.     ASSESSMENT/PLAN : treatment for chronic pain when exacerbation occurs.    Exacerbation/regression    Diagnoses (See diagnoses listed above in encounter notes.)    Response to previous treatment:  Pt responding well to therapy and has now reached MMI from this episode        Chiropractic manipulation: C2, C3, T5, T7, T9  Extraspinal adjustment: NONE TODAY      Modalities: Acupuncture: points to the R shoulder and elbow. Ismael points to the cervical spine 15 minutes prone.   Therapeutic exercise: 1st rib stretching for 3 minutes-PNF stretching     FU: 1 week       Chronic pain. pt stable   Continue with PT as recommended

## 2017-12-08 ENCOUNTER — THERAPY VISIT (OUTPATIENT)
Dept: CHIROPRACTIC MEDICINE | Facility: CLINIC | Age: 57
End: 2017-12-08
Payer: COMMERCIAL

## 2017-12-08 DIAGNOSIS — G44.221 CHRONIC TENSION-TYPE HEADACHE, INTRACTABLE: ICD-10-CM

## 2017-12-08 DIAGNOSIS — G89.29 CHRONIC RIGHT SHOULDER PAIN: ICD-10-CM

## 2017-12-08 DIAGNOSIS — M54.2 CERVICALGIA: ICD-10-CM

## 2017-12-08 DIAGNOSIS — M99.04 SOMATIC DYSFUNCTION OF SACRAL REGION: ICD-10-CM

## 2017-12-08 DIAGNOSIS — M99.02 THORACIC SEGMENT DYSFUNCTION: ICD-10-CM

## 2017-12-08 DIAGNOSIS — M25.511 CHRONIC RIGHT SHOULDER PAIN: ICD-10-CM

## 2017-12-08 DIAGNOSIS — M54.12 CERVICOBRACHIAL NEURALGIA: Primary | ICD-10-CM

## 2017-12-08 PROCEDURE — 98940 CHIROPRACT MANJ 1-2 REGIONS: CPT | Mod: AT | Performed by: CHIROPRACTOR

## 2017-12-08 PROCEDURE — 97810 ACUP 1/> WO ESTIM 1ST 15 MIN: CPT | Mod: GA | Performed by: CHIROPRACTOR

## 2017-12-08 NOTE — PROGRESS NOTES
VISIT #:15/2017 Chronic pain- R shoulder/paresthesias    SUBJECTIVE:   The pt returns with 75% subjective improvement since initial presentation. She notes less pain in the R arm and ability to sleep longer without waking. Lifting her R arm above her head will irritate the  mid back area. She notes R sided neck pain without HA. She denies constant paresthesias in the R hand when seated at the computer. The pt denies weakness or other unusual sx.     OBJECTIVE: C2, C3 right, T5,  T8, T9   suboccipitals, R SCM ( lesser suboccipital nerve), R LR levator scapula, B QL, L  gluteus medius hypertonicity with tenderness to palpation.     ASSESSMENT/PLAN : treatment for chronic pain when exacerbation occurs.    Exacerbation/regression    Diagnoses (See diagnoses listed above in encounter notes.)    Response to previous treatment:  Pt responding well to therapy and has now reached MMI from this episode        Chiropractic manipulation: C2, C3, T5, T7, T9  Extraspinal adjustment: NONE TODAY      Modalities: Acupuncture: points to the R shoulder and elbow. Ismael points to the cervical spine 15 minutes prone.   Therapeutic exercise: 1st rib stretching for 3 minutes-PNF stretching     FU: 2-3 weeks     Chronic pain. pt stable   Continue with PT as recommended

## 2017-12-08 NOTE — MR AVS SNAPSHOT
After Visit Summary   12/8/2017    Sulema Murphy    MRN: [id number]           Patient Information     Date Of Birth          1960        Visit Information        Provider Department      12/8/2017 7:30 AM Suzi Whittington DC [facility] - [place] Chiropractor        Today's Diagnoses     Cervicobrachial neuralgia    -  1    Thoracic segment dysfunction        Somatic dysfunction of sacral region        Chronic right shoulder pain        Chronic tension-type headache, intractable        Cervicalgia           Follow-ups after your visit        Your next 10 appointments already scheduled     Dec 20, 2017  3:30 PM CST   Chiro Acupuncture Follow Up with Suzi Whittington DC   [facility] - [place] Chiropractor ([facility] [place])    [address] [place] [address]  [address]  [place] [address] [phone]              Who to contact     If you have questions or need follow up information about today's clinic visit or your schedule please contact [facility] [place] CHIROPRACTOR directly at [phone].  Normal or non-critical lab and imaging results will be communicated to you by Wine Nation[employer], letter or phone within 4 business days after the clinic has received the results. If you do not hear from us within 7 days, please contact the clinic through VisionGatet or phone. If you have a critical or abnormal lab result, we will notify you by phone as soon as possible.  Submit refill requests through [employer]el or call your pharmacy and they will forward the refill request to us. Please allow 3 business days for your refill to be completed.          Additional Information About Your Visit        Wine Nationhart Information     [employer] gives you secure access to your electronic health record. If you see a primary care provider, you can also send messages to your care team and make appointments. If you have questions, please call your  primary care clinic.  If you do not have a primary care provider, please call [phone] and they will assist you.        Care EveryWhere ID     This is your Care EveryWhere ID. This could be used by other organizations to access your [place] medical records  [id number]         Blood Pressure from Last 3 Encounters:   02/22/16 114/78   08/14/13 112/62   05/14/15 121/79    Weight from Last 3 Encounters:   02/22/16 53.5 kg (118 lb)   08/14/13 54.4 kg (120 lb)   05/14/15 54.9 kg (121 lb)              We Performed the Following     ACUPUNCTURE, 1+ NEEDLES, W/O ELECTRICAL STIM; INIT 15 MIN PERSONAL CONTACT     CHIROPRAC MANIP,SPINAL,1-2 REGIONS        Primary Care Provider Office Phone # Fax #    [facility] [phone]       [address] [place] [address], [address]  [facility] [address]        Equal Access to Services     JOHANN BROUSSARD : Hadii ina ku hadasho Soomaali, waaxda luqadaha, qaybta kaalmada adeegyada, mague martin . So [facility] [phone].    ATENCIÓN: Si isa rios, tiene a julien disposición servicios gratuitos de asistencia lingüística. Llame al [phone].    We comply with applicable federal civil rights laws and [place] laws. We do not discriminate on the basis of race, color, national origin, age, disability, sex, sexual orientation, or gender identity.            Thank you!     Thank you for choosing [facility]  [facility]  for your care. Our goal is always to provide you with excellent care. Hearing back from our patients is one way we can continue to improve our services. Please take a few minutes to complete the written survey that you may receive in the mail after your visit with us. Thank you!             Your Updated Medication List - Protect others around you: Learn how to safely use, store and throw away your medicines at www.disposemymeds.org.          This list is accurate as of: 12/8/17  8:36  AM.  Always use your most recent med list.                   Brand Name Dispense Instructions for use Diagnosis    CALCIUM PO      Take  by mouth.        cycloSPORINE 0.05 % ophthalmic emulsion    RESTASIS     1 drop every 12 hours.        GLUCOSAMINE-CHONDROITIN PO      Take  by mouth.        METROLOTION 0.75 % Lotn   Generic drug:  metroNIDAZOLE      Apply thin layer to the affected area by topical route 2 times per day in the morning and evening        MULTI-VITAMINS PO      Take  by mouth.        SYNTHROID 137 MCG tablet   Generic drug:  levothyroxine     3 MONTHS    ONE TABLET DAILY IN THE MORNING    Pain in limb, Plantar fascial fibromatosis

## 2017-12-20 ENCOUNTER — THERAPY VISIT (OUTPATIENT)
Dept: CHIROPRACTIC MEDICINE | Facility: CLINIC | Age: 57
End: 2017-12-20
Payer: COMMERCIAL

## 2017-12-20 DIAGNOSIS — M54.2 CERVICALGIA: ICD-10-CM

## 2017-12-20 DIAGNOSIS — M54.12 CERVICOBRACHIAL NEURALGIA: Primary | ICD-10-CM

## 2017-12-20 DIAGNOSIS — M99.02 THORACIC SEGMENT DYSFUNCTION: ICD-10-CM

## 2017-12-20 DIAGNOSIS — M54.50 CHRONIC LOW BACK PAIN WITHOUT SCIATICA, UNSPECIFIED BACK PAIN LATERALITY: ICD-10-CM

## 2017-12-20 DIAGNOSIS — M99.04 SOMATIC DYSFUNCTION OF SACRAL REGION: ICD-10-CM

## 2017-12-20 DIAGNOSIS — G44.221 CHRONIC TENSION-TYPE HEADACHE, INTRACTABLE: ICD-10-CM

## 2017-12-20 DIAGNOSIS — G89.29 CHRONIC RIGHT SHOULDER PAIN: ICD-10-CM

## 2017-12-20 DIAGNOSIS — G89.29 CHRONIC LOW BACK PAIN WITHOUT SCIATICA, UNSPECIFIED BACK PAIN LATERALITY: ICD-10-CM

## 2017-12-20 DIAGNOSIS — M25.511 CHRONIC RIGHT SHOULDER PAIN: ICD-10-CM

## 2017-12-20 PROCEDURE — 97810 ACUP 1/> WO ESTIM 1ST 15 MIN: CPT | Mod: GA | Performed by: CHIROPRACTOR

## 2017-12-20 PROCEDURE — 98940 CHIROPRACT MANJ 1-2 REGIONS: CPT | Mod: AT | Performed by: CHIROPRACTOR

## 2017-12-20 NOTE — MR AVS SNAPSHOT
After Visit Summary   12/20/2017    Sulema Murphy    MRN: [id number]           Patient Information     Date Of Birth          1960        Visit Information        Provider Department      12/20/2017 3:30 PM Suzi Whittington DC [place] [facility] - [place] Chiropractor        Today's Diagnoses     Cervicobrachial neuralgia    -  1    Thoracic segment dysfunction        Somatic dysfunction of sacral region        Chronic right shoulder pain        Chronic tension-type headache, intractable        Cervicalgia        Chronic low back pain without sciatica, unspecified back pain laterality           Follow-ups after your visit        Who to contact     If you have questions or need follow up information about today's clinic visit or your schedule please contact [facility] ATHLETIC [facility] - [facility]IK[facility] [place] CHIROPRACTOR directly at [phone].  Normal or non-critical lab and imaging results will be communicated to you by Auro Mira Energyhart, letter or phone within 4 business days after the clinic has received the results. If you do not hear from us within 7 days, please contact the clinic through Auro Mira Energyhart or phone. If you have a critical or abnormal lab result, we will notify you by phone as soon as possible.  Submit refill requests through CellEra or call your pharmacy and they will forward the refill request to us. Please allow 3 business days for your refill to be completed.          Additional Information About Your Visit        MyChart Information     CellEra gives you secure access to your electronic health record. If you see a primary care provider, you can also send messages to your care team and make appointments. If you have questions, please call your primary care clinic.  If you do not have a primary care provider, please call [phone] and they will assist you.        Care EveryWhere ID     This is your Care EveryWhere ID. This could be used by other organizations  to access your [place] medical records  [id number]         Blood Pressure from Last 3 Encounters:   02/22/16 114/78   08/14/13 112/62   05/14/15 121/79    Weight from Last 3 Encounters:   02/22/16 53.5 kg (118 lb)   08/14/13 54.4 kg (120 lb)   05/14/15 54.9 kg (121 lb)              We Performed the Following     ACUPUNCTURE, 1+ NEEDLES, W/O ELECTRICAL STIM; INIT 15 MIN PERSONAL CONTACT     CHIROPRAC MANIP,SPINAL,1-2 REGIONS        Primary Care Provider Office Phone # Fax #    [facility] [phone]       [facility] [facility], [address]  [facility] [address]        Equal Access to Services     JOHANN BROUSSARD : Von hancocko Soomaali, waaxda luqadaha, qaybta kaalmada adeegyadequan, mague rene. So [facility] [phone].    ATENCIÓN: Si habla español, tiene a julien disposición servicios gratuitos de asistencia lingüística. Llame al [phone].    We comply with applicable federal civil rights laws and [place] laws. We do not discriminate on the basis of race, color, national origin, age, disability, sex, sexual orientation, or gender identity.            Thank you!     Thank you for choosing [place] [facility] CHIROPRACTOR  for your care. Our goal is always to provide you with excellent care. Hearing back from our patients is one way we can continue to improve our services. Please take a few minutes to complete the written survey that you may receive in the mail after your visit with us. Thank you!             Your Updated Medication List - Protect others around you: Learn how to safely use, store and throw away your medicines at www.disposemymeds.org.          This list is accurate as of: 12/20/17 11:59 PM.  Always use your most recent med list.                   Brand Name Dispense Instructions for use Diagnosis    CALCIUM PO      Take  by mouth.        cycloSPORINE 0.05 % ophthalmic emulsion    RESTASIS     1 drop every  12 hours.        GLUCOSAMINE-CHONDROITIN PO      Take  by mouth.        METROLOTION 0.75 % Lotn   Generic drug:  metroNIDAZOLE      Apply thin layer to the affected area by topical route 2 times per day in the morning and evening        MULTI-VITAMINS PO      Take  by mouth.        SYNTHROID 137 MCG tablet   Generic drug:  levothyroxine     3 MONTHS    ONE TABLET DAILY IN THE MORNING    Pain in limb, Plantar fascial fibromatosis

## 2017-12-27 PROBLEM — M99.00 HEAD REGION SOMATIC DYSFUNCTION: Status: RESOLVED | Noted: 2017-01-13 | Resolved: 2017-12-27

## 2017-12-27 NOTE — PROGRESS NOTES
VISIT #:16/2017 Chronic pain- R shoulder/paresthesias    SUBJECTIVE:   The pt returns with 75% to 80%subjective improvement since initial presentation. She notes minimal irritation in the R lateral rib area. She is able to sleep in her side and lift her arm with less pain in the flank. Only occasionally does she feel paresthesias in the R hand. She is pleased with her progress. The pt denies weakness or other unusual sx.      OBJECTIVE: C2, C3 right, T5,  T8, T9   suboccipitals, R SCM ( lesser suboccipital nerve), R LR levator scapula, B QL, R longus coli, R teres major minor/lastissimus dorsi.     ASSESSMENT/PLAN : treatment for chronic pain when exacerbation occurs.    Pt approaching MMI    Diagnoses (See diagnoses listed above in encounter notes.)    Response to previous treatment:   No increase in sx, pt stable    Chiropractic manipulation: C2, C3, T5, T7, T9  Extraspinal adjustment: NONE TODAY      Modalities: Acupuncture: points to the R shoulder and elbow. Ismael points to the cervical spine 15 minutes prone.   Therapeutic exercise: 1st rib stretching for 3 minutes-PNF stretching     FU: 2-3 weeks     Chronic pain. pt stable   Continue with PT as recommended

## 2018-01-12 ENCOUNTER — THERAPY VISIT (OUTPATIENT)
Dept: CHIROPRACTIC MEDICINE | Facility: CLINIC | Age: 58
End: 2018-01-12
Payer: COMMERCIAL

## 2018-01-12 DIAGNOSIS — M54.12 CERVICOBRACHIAL NEURALGIA: Primary | ICD-10-CM

## 2018-01-12 DIAGNOSIS — M99.02 THORACIC SEGMENT DYSFUNCTION: ICD-10-CM

## 2018-01-12 DIAGNOSIS — G89.29 CHRONIC RIGHT SHOULDER PAIN: ICD-10-CM

## 2018-01-12 DIAGNOSIS — M54.2 CERVICALGIA: ICD-10-CM

## 2018-01-12 DIAGNOSIS — M25.511 CHRONIC RIGHT SHOULDER PAIN: ICD-10-CM

## 2018-01-12 DIAGNOSIS — G44.221 CHRONIC TENSION-TYPE HEADACHE, INTRACTABLE: ICD-10-CM

## 2018-01-12 PROCEDURE — 98940 CHIROPRACT MANJ 1-2 REGIONS: CPT | Mod: AT | Performed by: CHIROPRACTOR

## 2018-01-12 PROCEDURE — 97810 ACUP 1/> WO ESTIM 1ST 15 MIN: CPT | Mod: GA | Performed by: CHIROPRACTOR

## 2018-01-12 NOTE — PROGRESS NOTES
VISIT #:1/2018 Chronic pain- R shoulder/paresthesias    SUBJECTIVE:   The pt returns with 80% improvement in the R thoracic area. She states she shoveled snow yesterday and reported a slight increase in sx. The pain radiated to the R anterior ribs. She denies HA sx. The pt denies weakness or other unusual sx.       OBJECTIVE: C2, C5 right, T5,  T9   suboccipitals, R SCM ( lesser suboccipital nerve), R LR levator scapula, B QL, R longus coli, R teres major minor/lastissimus dorsi.     ASSESSMENT/PLAN : treatment for chronic pain when exacerbation occurs.    Pt approaching MMI    Diagnoses (See diagnoses listed above in encounter notes.)    Response to previous treatment:   No increase in sx, pt stable    Chiropractic manipulation: C2, C5 T5, T7, T9  Extraspinal adjustment: NONE TODAY      Modalities: Acupuncture: points to the R shoulder and elbow. Ismael points to the cervical spine 15 minutes prone.   Therapeutic exercise: 1st rib stretching for 3 minutes-PNF stretching     FU: none or if sx persist     Chronic pain. pt stable   Continue with PT as recommended

## 2018-01-12 NOTE — MR AVS SNAPSHOT
After Visit Summary   1/12/2018    Sulema Murphy    MRN: [id number]           Patient Information     Date Of Birth          1960        Visit Information        Provider Department      1/12/2018 12:00 PM Suzi Whittington DC [facility] - [place] Chiropractor        Today's Diagnoses     Cervicobrachial neuralgia    -  1    Thoracic segment dysfunction        Chronic right shoulder pain        Chronic tension-type headache, intractable        Cervicalgia           Follow-ups after your visit        Who to contact     If you have questions or need follow up information about today's clinic visit or your schedule please contact [facility] ATHLETIC [facility] - ANIKA [place] CHIROPRACTOR directly at [phone].  Normal or non-critical lab and imaging results will be communicated to you by [employer] Grouphart, letter or phone within 4 business days after the clinic has received the results. If you do not hear from us within 7 days, please contact the clinic through [employer]ou[employer] or phone. If you have a critical or abnormal lab result, we will notify you by phone as soon as possible.  Submit refill requests through [facility] or call your pharmacy and they will forward the refill request to us. Please allow 3 business days for your refill to be completed.          Additional Information About Your Visit        MyChart Information     [facility] gives you secure access to your electronic health record. If you see a primary care provider, you can also send messages to your care team and make appointments. If you have questions, please call your primary care clinic.  If you do not have a primary care provider, please call [phone] and they will assist you.        Care EveryWhere ID     This is your Care EveryWhere ID. This could be used by other organizations to access your [place] medical records  [id number]         Blood Pressure from Last 3 Encounters:   02/22/16 114/78    08/14/13 112/62   05/14/15 121/79    Weight from Last 3 Encounters:   02/22/16 53.5 kg (118 lb)   08/14/13 54.4 kg (120 lb)   05/14/15 54.9 kg (121 lb)              We Performed the Following     ACUPUNCTURE, 1+ NEEDLES, W/O ELECTRICAL STIM; INIT 15 MIN PERSONAL CONTACT     CHIROPRAC MANIP,SPINAL,1-2 REGIONS        Primary Care Provider Office Phone # Fax #    [facility] [phone]       [facility] [facility], [address]  [facility] [address]        Equal Access to Services     [facility]: Hadii ina barber Sosina, waaxda lucasandra, qaybta kaalmada fercho, mague martin . So [facility] [phone].    ATENCIÓN: Si habla español, tiene a julien disposición servicios gratuitos de asistencia lingüística. [facility] [phone].    We comply with applicable federal civil rights laws and [place] laws. We do not discriminate on the basis of race, color, national origin, age, disability, sex, sexual orientation, or gender identity.            Thank you!     Thank you for choosing [facility] [place]  for your care. Our goal is always to provide you with excellent care. Hearing back from our patients is one way we can continue to improve our services. Please take a few minutes to complete the written survey that you may receive in the mail after your visit with us. Thank you!             Your Updated Medication List - Protect others around you: Learn how to safely use, store and throw away your medicines at www.disposemymeds.org.          This list is accurate as of: 1/12/18  1:01 PM.  Always use your most recent med list.                   Brand Name Dispense Instructions for use Diagnosis    CALCIUM PO      Take  by mouth.        cycloSPORINE 0.05 % ophthalmic emulsion    RESTASIS     1 drop every 12 hours.        GLUCOSAMINE-CHONDROITIN PO      Take  by mouth.        METROLOTION 0.75 % Lotn   Generic drug:   metroNIDAZOLE      Apply thin layer to the affected area by topical route 2 times per day in the morning and evening        MULTI-VITAMINS PO      Take  by mouth.        SYNTHROID 137 MCG tablet   Generic drug:  levothyroxine     3 MONTHS    ONE TABLET DAILY IN THE MORNING    Pain in limb, Plantar fascial fibromatosis

## 2018-04-11 ENCOUNTER — THERAPY VISIT (OUTPATIENT)
Dept: CHIROPRACTIC MEDICINE | Facility: CLINIC | Age: 58
End: 2018-04-11
Payer: COMMERCIAL

## 2018-04-11 DIAGNOSIS — M54.12 CERVICOBRACHIAL NEURALGIA: ICD-10-CM

## 2018-04-11 DIAGNOSIS — G89.29 CHRONIC RIGHT SHOULDER PAIN: ICD-10-CM

## 2018-04-11 DIAGNOSIS — G44.221 CHRONIC TENSION-TYPE HEADACHE, INTRACTABLE: ICD-10-CM

## 2018-04-11 DIAGNOSIS — M54.2 CERVICALGIA: ICD-10-CM

## 2018-04-11 DIAGNOSIS — M25.511 CHRONIC RIGHT SHOULDER PAIN: ICD-10-CM

## 2018-04-11 DIAGNOSIS — M99.02 THORACIC SEGMENT DYSFUNCTION: ICD-10-CM

## 2018-04-11 DIAGNOSIS — G89.29 CHRONIC RIGHT-SIDED THORACIC BACK PAIN: Primary | ICD-10-CM

## 2018-04-11 DIAGNOSIS — M99.04 SOMATIC DYSFUNCTION OF SACRAL REGION: ICD-10-CM

## 2018-04-11 DIAGNOSIS — M54.6 CHRONIC RIGHT-SIDED THORACIC BACK PAIN: Primary | ICD-10-CM

## 2018-04-11 PROCEDURE — 98940 CHIROPRACT MANJ 1-2 REGIONS: CPT | Mod: AT | Performed by: CHIROPRACTOR

## 2018-04-11 PROCEDURE — 97035 APP MDLTY 1+ULTRASOUND EA 15: CPT | Performed by: CHIROPRACTOR

## 2018-04-11 NOTE — PROGRESS NOTES
VISIT #:2/2018 Chronic pain- mid back and R shoulder/paresthesias    SUBJECTIVE:   The pt returns with  Increased pain in the mid back area with radiation of pain in the R anterior chest. She has had this pain since September of last year. Acupuncture reduces the px temporarily however the pain seems to persist. It is now affecting the right side of the neck and shoulder. She is having difficulty laying down and sleeping. The pt denies weakness or other unusual sx.        OBJECTIVE: C2, C5 right, T5,  T7, T9     suboccipitals, R SCM ( lesser suboccipital nerve), R LR levator scapula, B QL, R longus coli, R teres major minor/lastissimus dorsi.     ASSESSMENT/PLAN : exacerbation/regression in the mid back area     Diagnoses (See diagnoses listed above in encounter notes.)    Response to previous treatment:   No increase in sx, pt stable    Chiropractic manipulation: C2, C5 T5, T7, T9  Extraspinal adjustment: NONE TODAY      Modalities: US therapy mid back paraspinals @ 1.7 christensen/cm2 @ 1 MHZ      Therapeutic exercise: 1st rib stretching for 3 minutes-PNF stretching     FU: 1 week with check up   FU with pain management    Chronic pain. pt stable   Continue with PT as recommended

## 2018-04-13 ENCOUNTER — TELEPHONE (OUTPATIENT)
Dept: NEUROSURGERY | Facility: CLINIC | Age: 58
End: 2018-04-13

## 2018-04-13 NOTE — TELEPHONE ENCOUNTER
Return message left for patient, will await a return call.  Reviewed history with patient.  Scheduled for NEW patient evaluation on 4/24/18 with Radha Delgado.  Palmer has no further questions at this time.

## 2018-04-13 NOTE — TELEPHONE ENCOUNTER
REASON FOR CALL:  Pt was instructed by Lakeisha Hurtado to give Radha a call to get questions answered about potentially getting scheduled and seen with [facility].     Detailed message can be left:  NO (Recommended)

## 2018-04-23 ENCOUNTER — THERAPY VISIT (OUTPATIENT)
Dept: CHIROPRACTIC MEDICINE | Facility: CLINIC | Age: 58
End: 2018-04-23
Payer: COMMERCIAL

## 2018-04-23 DIAGNOSIS — M99.02 THORACIC SEGMENT DYSFUNCTION: ICD-10-CM

## 2018-04-23 DIAGNOSIS — M54.12 CERVICOBRACHIAL NEURALGIA: ICD-10-CM

## 2018-04-23 DIAGNOSIS — G44.221 CHRONIC TENSION-TYPE HEADACHE, INTRACTABLE: ICD-10-CM

## 2018-04-23 DIAGNOSIS — M54.6 CHRONIC BILATERAL THORACIC BACK PAIN: Primary | ICD-10-CM

## 2018-04-23 DIAGNOSIS — M54.2 CERVICALGIA: ICD-10-CM

## 2018-04-23 DIAGNOSIS — G89.29 CHRONIC BILATERAL THORACIC BACK PAIN: Primary | ICD-10-CM

## 2018-04-23 PROCEDURE — 97035 APP MDLTY 1+ULTRASOUND EA 15: CPT | Performed by: CHIROPRACTOR

## 2018-04-23 PROCEDURE — 98940 CHIROPRACT MANJ 1-2 REGIONS: CPT | Mod: AT | Performed by: CHIROPRACTOR

## 2018-04-23 NOTE — MR AVS SNAPSHOT
After Visit Summary   4/23/2018    Sulema Murphy    MRN: [id number]           Patient Information     Date Of Birth          1960        Visit Information        Provider Department      4/23/2018 3:00 PM Suzi Whittington DC [facility]        Today's Diagnoses     Chronic bilateral thoracic back pain    -  1    Cervicobrachial neuralgia        Thoracic segment dysfunction        Chronic tension-type headache, intractable        Cervicalgia           Follow-ups after your visit        Your next 10 appointments already scheduled     Apr 24, 2018 11:40 AM CDT   New Visit with [place] Sebastian CNP   [facility] ([facility])    [address]  [facility] [address]   [phone]            May 07, 2018  7:30 AM CDT   [facility] Chi[facility]ractor with TEGAN Baez Chiro ([facility] [place]  )    [address]  [place] [address]   [phone]            May 21, 2018  4:00 PM CDT   [facility] Chiropractor with TEGAN Baez Chiro ([facility] [place]  )    [address]  [place] [address]   [phone]              Who to contact     If you have questions or need follow up information about today's clinic visit or your schedule please contact [facility] directly at [phone].  Normal or non-critical lab and imaging results will be communicated to you by MyChart, letter or phone within 4 business days after the clinic has received the results. If you do not hear from us within 7 days, please contact the clinic through MyChart or phone. If you have a critical or abnormal lab result, we will notify you by phone as soon as possible.  Submit refill requests through [employer] or call your pharmacy and they will forward the refill request to us. Please allow 3 business days for your refill to be completed.          Additional Information About Your Visit        MyChart  Information     [employer] gives you secure access to your electronic health record. If you see a primary care provider, you can also send messages to your care team and make appointments. If you have questions, please call your primary care clinic.  If you do not have a primary care provider, please call [phone] and they will assist you.        Care EveryWhere ID     This is your Care EveryWhere ID. This could be used by other organizations to access your [place] medical records  [id number]         Blood Pressure from Last 3 Encounters:   02/22/16 114/78   08/14/13 112/62   05/14/15 121/79    Weight from Last 3 Encounters:   02/22/16 53.5 kg (118 lb)   08/14/13 54.4 kg (120 lb)   05/14/15 54.9 kg (121 lb)              We Performed the Following     CHIROPRAC MANIP,SPINAL,1-2 REGIONS     ULTRASOUND THERAPY        Primary Care Provider Office Phone # Fax #    [facility] [phone]       [address] [place] [address], [address]  [facility] [address]        Equal Access to Services     JOSEPHINE BROUSSARD : Hadii ina saldana hadasho Sosina, waaxda luqadaha, qaybta kaalmada fercho, mague martin . So [facility] [phone].    ATENCIÓN: Si habla español, tiene a julien disposición servicios gratUNM Carrie Tingley Hospitalos de asistencia lingüística. [facility] [phone].    We comply with applicable federal civil rights laws and [place] laws. We do not discriminate on the basis of race, color, national origin, age, disability, sex, sexual orientation, or gender identity.            Thank you!     Thank you for choosing [facility]  for your care. Our goal is always to provide you with excellent care. Hearing back from our patients is one way we can continue to improve our services. Please take a few minutes to complete the written survey that you may receive in the mail after your visit with us. Thank you!             Your Updated Medication List - Protect others around you: Learn how to  safely use, store and throw away your medicines at www.disposemymeds.org.          This list is accurate as of 4/23/18  4:28 PM.  Always use your most recent med list.                   Brand Name Dispense Instructions for use Diagnosis    CALCIUM PO      Take  by mouth.        cycloSPORINE 0.05 % ophthalmic emulsion    RESTASIS     1 drop every 12 hours.        GLUCOSAMINE-CHONDROITIN PO      Take  by mouth.        METROLOTION 0.75 % Lotn   Generic drug:  metroNIDAZOLE      Apply thin layer to the affected area by topical route 2 times per day in the morning and evening        MULTI-VITAMINS PO      Take  by mouth.        SYNTHROID 137 MCG tablet   Generic drug:  levothyroxine     3 MONTHS    ONE TABLET DAILY IN THE MORNING    Pain in limb, Plantar fascial fibromatosis

## 2018-04-23 NOTE — PROGRESS NOTES
VISIT #:3/2018 Chronic pain- mid back and R shoulder/paresthesias    SUBJECTIVE:   The pt returns with at least 70% reduction of sx in the neck area with decreased stiffness. She continues to reports mid back pain more on the right side. At times it will radiate in the anterior chest area. She is having difficulty sitting for long periods. She will be seeing pain management or neurosurgery soon. The pt denies weakness or other unusual sx.      OBJECTIVE: C2, C5 right, T5,  T7, T9     suboccipitals, R SCM ( lesser suboccipital nerve), R LR levator scapula, B QL, R longus coli, R teres major minor/lastissimus dorsi.     ASSESSMENT/PLAN :  Good response to prior treatment.      Diagnoses (See diagnoses listed above in encounter notes.)    Response to previous treatment:   No increase in sx, pt stable    Chiropractic manipulation: C2, C5 T5, T7, T9  Extraspinal adjustment: NONE TODAY      Modalities: US therapy mid back paraspinals @ 1.8 christensen/cm2 @ 1 MHZ      Therapeutic exercise: 1st rib stretching for 3 minutes-PNF stretching     FU: 1 week with check up   FU with ACP to mid back    Chronic pain. pt stable   Continue with PT as recommended

## 2018-04-30 ENCOUNTER — TELEPHONE (OUTPATIENT)
Dept: PALLIATIVE MEDICINE | Facility: CLINIC | Age: 58
End: 2018-04-30

## 2018-04-30 NOTE — TELEPHONE ENCOUNTER
[facility] Management Center Referral      1. Confirmed address with patient? Yes  2. Confirmed phone number with patient? Yes  3. Confirmed referring provider? Yes  4. Is the PCP the same as the referring provider? Yes  5. Has the patient been to any previous pain clinics? No  (If yes, send YOSEF with welcome letter)  6. Which insurance are we to bill for this appointment?  Preferred one    7. Informed pt of cancellation (48 hour) policy? Yes    REGARDING OPIOID MEDICATIONS: We will always address appropriateness of opioid pain medications, but we generally will not automatically take on a prescribing role. When we do take on prescribing of opioids for chronic pain, it is in collaboration with the referring physician for an intermediate period of time (months), with an expectation that the primary physician or provider will assume the prescribing role if medications are effective at stable doses with demonstrated compliance. Therefore, please do not assume that your prescribing responsibilities end on the day of pain clinic consultation.  7. Informed pt of prescribing policy? Yes      8. Referring Provider: Clinic, [facility]

## 2018-05-07 ENCOUNTER — THERAPY VISIT (OUTPATIENT)
Dept: CHIROPRACTIC MEDICINE | Facility: CLINIC | Age: 58
End: 2018-05-07
Payer: COMMERCIAL

## 2018-05-07 DIAGNOSIS — G44.221 CHRONIC TENSION-TYPE HEADACHE, INTRACTABLE: ICD-10-CM

## 2018-05-07 DIAGNOSIS — G89.29 CHRONIC RIGHT SHOULDER PAIN: ICD-10-CM

## 2018-05-07 DIAGNOSIS — M99.04 SOMATIC DYSFUNCTION OF SACRAL REGION: ICD-10-CM

## 2018-05-07 DIAGNOSIS — M54.6 CHRONIC BILATERAL THORACIC BACK PAIN: Primary | ICD-10-CM

## 2018-05-07 DIAGNOSIS — M54.2 CERVICALGIA: ICD-10-CM

## 2018-05-07 DIAGNOSIS — M25.511 CHRONIC RIGHT SHOULDER PAIN: ICD-10-CM

## 2018-05-07 DIAGNOSIS — G89.29 CHRONIC BILATERAL THORACIC BACK PAIN: Primary | ICD-10-CM

## 2018-05-07 DIAGNOSIS — M99.02 THORACIC SEGMENT DYSFUNCTION: ICD-10-CM

## 2018-05-07 DIAGNOSIS — M54.12 CERVICOBRACHIAL NEURALGIA: ICD-10-CM

## 2018-05-07 PROCEDURE — 97810 ACUP 1/> WO ESTIM 1ST 15 MIN: CPT | Mod: GA | Performed by: CHIROPRACTOR

## 2018-05-07 PROCEDURE — 98940 CHIROPRACT MANJ 1-2 REGIONS: CPT | Mod: AT | Performed by: CHIROPRACTOR

## 2018-05-07 NOTE — MR AVS SNAPSHOT
After Visit Summary   5/7/2018    Sulema Murphy    MRN: [id number]           Patient Information     Date Of Birth          1960        Visit Information        Provider Department      5/7/2018 7:30 AM Suzi Whittington DC [facility]        Today's Diagnoses     Chronic bilateral thoracic back pain    -  1    Cervicobrachial neuralgia        Thoracic segment dysfunction        Somatic dysfunction of sacral region        Chronic right shoulder pain        Chronic tension-type headache, intractable        Cervicalgia           Follow-ups after your visit        Your next 10 appointments already scheduled     May 21, 2018  1:30 PM CDT   PROCEDURE with Shashank Brown MD   [facility] ([facility])    [address]  [address]  [place] [facility] [address]   [phone]            May 21, 2018  4:00 PM CDT   [facility] TEGAN Baez ([facility]  )    [address]  [facility] [place] [address] [phone]              Who to contact     If you have questions or need follow up information about today's clinic visit or your schedule please contact [facility] directly at [phone].  Normal or non-critical lab and imaging results will be communicated to you by MyChart, letter or phone within 4 business days after the clinic has received the results. If you do not hear from us within 7 days, please contact the clinic through Signposthart or phone. If you have a critical or abnormal lab result, we will notify you by phone as soon as possible.  Submit refill requests through Girltank or call your pharmacy and they will forward the refill request to us. Please allow 3 business days for your refill to be completed.          Additional Information About Your Visit        Signposthart Information     Girltank gives you secure access to your electronic health record. If you see a primary care provider,  you can also send messages to your care team and make appointments. If you have questions, please call your primary care clinic.  If you do not have a primary care provider, please call [phone] and they will assist you.        Care EveryWhere ID     This is your Care EveryWhere ID. This could be used by other organizations to access your [place] medical records  [id number]         Blood Pressure from Last 3 Encounters:   02/22/16 114/78   08/14/13 112/62   05/14/15 121/79    Weight from Last 3 Encounters:   02/22/16 53.5 kg (118 lb)   08/14/13 54.4 kg (120 lb)   05/14/15 54.9 kg (121 lb)              We Performed the Following     ACUPUNCTURE, 1+ NEEDLES, W/O ELECTRICAL STIM; INIT 15 MIN PERSONAL CONTACT     CHIROPRAC MANIP,SPINAL,1-2 REGIONS        Primary Care Provider Office Phone # Fax #    [facility] [phone]       [address] [place] [address], [address]  [facility] [address]        Equal Access to Services     JOHANN BROUSSARD : Hadii aad ku hadasho Soomaali, waaxda luqadaha, qaybta kaalmada adeegyada, mague martin . So [facility] [phone].    ATENCIÓN: Si habla español, tiene a julien disposición servicios gratuitos de asistencia lingüística. Llame al [phone].    We comply with applicable federal civil rights laws and [place] laws. We do not discriminate on the basis of race, color, national origin, age, disability, sex, sexual orientation, or gender identity.            Thank you!     Thank you for choosing [facility]  for your care. Our goal is always to provide you with excellent care. Hearing back from our patients is one way we can continue to improve our services. Please take a few minutes to complete the written survey that you may receive in the mail after your visit with us. Thank you!             Your Updated Medication List - Protect others around you: Learn how to safely use, store and throw away your medicines at  www.disposemymeds.org.          This list is accurate as of 5/7/18  8:04 AM.  Always use your most recent med list.                   Brand Name Dispense Instructions for use Diagnosis    CALCIUM PO      Take  by mouth.        cycloSPORINE 0.05 % ophthalmic emulsion    RESTASIS     1 drop every 12 hours.        GLUCOSAMINE-CHONDROITIN PO      Take  by mouth.        METROLOTION 0.75 % Lotn   Generic drug:  metroNIDAZOLE      Apply thin layer to the affected area by topical route 2 times per day in the morning and evening        MULTI-VITAMINS PO      Take  by mouth.        SYNTHROID 137 MCG tablet   Generic drug:  levothyroxine     3 MONTHS    ONE TABLET DAILY IN THE MORNING    Pain in limb, Plantar fascial fibromatosis

## 2018-05-07 NOTE — PROGRESS NOTES
VISIT #:4/2018 Chronic pain- mid back and R shoulder/paresthesias    SUBJECTIVE:   The pt returns with continued pain in the R thoracic area. It radiates to the R anterior rib area and renders the patient nauseous. She notes R sided neck pain and radiating in the R upper shoulder.  She is having difficulty vacuuming and walking the dog. The pt denies weakness in the extremities or other unusual sx.      OBJECTIVE: C2, C5 right, T5,  T7, T10     suboccipitals, R SCM ( lesser suboccipital nerve), R LR levator scapula, B QL, R longus coli, R teres major minor/lastissimus dorsi.     ASSESSMENT/PLAN :  No change     Diagnoses (See diagnoses listed above in encounter notes.)    Response to previous treatment:   No increase in sx, pt stable    Chiropractic manipulation: C2, C5 T5, T7, T10  Prone, diversified       Modalities: ACP: kristine points in the cervical and R thoracic area, valeriy points in the R thoracic paraspinals   15 minutes prone      Therapeutic exercise: 1st rib stretching for 3 minutes-PNF stretching     FU: 1 week with check up   FU with ACP to mid back    Chronic pain. pt stable

## 2018-05-21 ENCOUNTER — OFFICE VISIT (OUTPATIENT)
Dept: PALLIATIVE MEDICINE | Facility: CLINIC | Age: 58
End: 2018-05-21
Attending: CHIROPRACTOR
Payer: COMMERCIAL

## 2018-05-21 VITALS
DIASTOLIC BLOOD PRESSURE: 86 MMHG | SYSTOLIC BLOOD PRESSURE: 130 MMHG | HEART RATE: 94 BPM | BODY MASS INDEX: 20.44 KG/M2 | WEIGHT: 120 LBS | OXYGEN SATURATION: 100 %

## 2018-05-21 DIAGNOSIS — M79.2 THORACIC NEURALGIA: Primary | ICD-10-CM

## 2018-05-21 PROCEDURE — 99205 OFFICE O/P NEW HI 60 MIN: CPT | Performed by: PAIN MEDICINE

## 2018-05-21 ASSESSMENT — PAIN SCALES - GENERAL: PAINLEVEL: EXTREME PAIN (8)

## 2018-05-21 NOTE — PROGRESS NOTES
[facility] Consultation    Date of visit: 5/21/2018    Reason for consultation:    Primary Care Provider is Clinic, [facility].  Pain medications are being prescribed by .    Please see the [facility] health questionnaire which the patient completed and reviewed with me in detail.    Chief Complaint:    Chief Complaint   Patient presents with     Pain       Pain history:  Of note patient broke her ribs in 1994 while pregnant.  She also had a breast reduction at the end of 1994 but no pain at that time.  Patient had shingles in 2001.  Of note patient denies having pain at that time.  Of note patient had a rash but no pain at that time.  Her current distribution of her pain is similar to where her rash was.  Although had no rash this time.  The pain is been getting progressively worse over the last 9 months.  The pain is a sharp shooting pain.  The pain is intermittent.  The pain is worse with turning and reaching.  The pain is slightly better with stretching.  The pain is slightly better with rest.  She notes numbness in the distribution of her pain to light touch.  Although, it is not painful to light touch.  She denies any acute injury to the area.  She denies any inciting event 9 months ago.  She has tried over-the-counter capsaicin and lidocaine   Without relief.  She denies any progressive weakness.  She denies any neck pain.  Overall the pain greatly affects her ADLs.     Of note patient did take acyclovir when she initially had the shingles in 2001  Pain rating: intensity  Averages 5/10 on a 0-10 scale.      Current treatments include:  none    Previous medication treatments included:  Capsaicin, lidocaine    Other treatments have included:  Sulemacarley Mirza Katherine has not been seen at a pain clinic in the past.  n/a  PT: Some help    Injections: no    Past Medical History:  Past Medical History:   Diagnosis Date     Abdominal pain, unspecified site       Condyloma acuminatum     on colpo biopsies     Gastric ulcer      Gastro-oesophageal reflux disease      Hiatal hernia      History of blood transfusion      Hypothyroidism     Follows with Endocrinology     Kidney stones      Lupus     Dermal lupus     Nonspecific elevation of levels of transaminase or lactic acid dehydrogenase (LDH)      Osteopenia      Pap smear of cervix not needed      PONV (postoperative nausea and vomiting)      Pyelonephritis      Rosacea      Rosacea      Sjoegren syndrome (H)      Sjogren's disease (H)     follows with Rheum     Thyroid disease      Past Surgical History:  Past Surgical History:   Procedure Laterality Date     BIOPSY VAGINAL  03    BENIGN POLYPOID GRANULATION TISSUE      SECTION       COLONOSCOPY       COLPOSCOPY, BIOPSY, COMBINED      for ascus x 3; biopsies showed flat condyloma     HC INSERTION INTRAUTERINE DEVICE  1994    copper 7     HC REDUCTION OF LARGE BREAST       HC REMOVE INTRAUTERINE DEVICE  98     HC UGI ENDOSCOPY W EUS  2013    Procedure: COMBINED ENDOSCOPIC ULTRASOUND, ESOPHAGOSCOPY, GASTROSCOPY, DUODENOSCOPY (EGD);  Surgeon: Grace Heck MD;  Location:  GI     HYSTERECTOMY      abdominal, with left oopherectomy for adhesions     LAPAROSCOPIC CHOLECYSTECTOMY  2013    Procedure: LAPAROSCOPIC CHOLECYSTECTOMY;  LAPAROSCOPIC CHOLECYSTECTOMY ;  Surgeon: Juan Carlos Bates MD;  Location:  [facility]     LAPAROTOMY EXPLORATORY      several; for adhesions     LASIK CUSTOMVUE BILATERAL  2014    Procedure: LASIK CUSTOMVUE BILATERAL;  BILATERAL CUSTOMVUE LASIK WITH INTRALASE;  Surgeon: Christi Kessler MD;  Location:  [facility]     left salpingoophorectomy       SHOULDER SURGERY       YOSELIN       WAVESCAN SCREENING  2014    Procedure: WAVESCAN SCREENING;  BILATERAL WAVESCAN;  Surgeon: Christi Kessler MD;  Location: [facility]     Medications:  Current Outpatient Prescriptions   Medication Sig  Dispense Refill     CALCIUM PO Take  by mouth.       cycloSPORINE (RESTASIS) 0.05 % ophthalmic emulsion 1 drop every 12 hours.       GLUCOSAMINE-CHONDROITIN PO Take  by mouth.       metroNIDAZOLE (METROLOTION) 0.75 % LOTN Apply thin layer to the affected area by topical route 2 times per day in the morning and evening       Multiple Vitamin (MULTI-VITAMINS PO) Take  by mouth.       SYNTHROID 137 MCG OR TABS ONE TABLET DAILY IN THE MORNING 3 MONTHS 1 YEAR     Allergies:     Allergies   Allergen Reactions     Codeine      Betadine Plus Rash     Penicillins Rash     Sulfa Drugs Rash     Tetracycline Rash     Social History:     Occupation/Schooling: rn  Tobacco use: no  Alcohol use: no  Drug use: no  History of chemical dependency treatment: no    Family history:  Family History   Problem Relation Age of Onset     Coronary Artery Disease Father      Coronary Artery Disease Maternal Grandfather      CANCER       other     Rheumatoid Arthritis Maternal Grandmother      Rheumatoid Arthritis Paternal Grandmother      Breast Cancer Maternal Aunt      Breast Cancer Paternal Aunt      DIABETES Paternal Grandfather      DIABETES Maternal Grandfather      Fractures       hip     Hyperlipidemia Father      Hyperlipidemia Sister      Hyperlipidemia Brother      Hyperlipidemia Maternal Grandmother      Hyperlipidemia Maternal Grandfather      Hyperlipidemia Paternal Grandmother      Hyperlipidemia Paternal Grandfather      Hypertension Father      Hypertension Maternal Grandfather      Hypertension Paternal Grandfather      Coronary Artery Disease Paternal Grandfather      OSTEOPOROSIS Mother      OSTEOPOROSIS       Thyroid Disease Mother      Thyroid Disease Sister      Thyroid Disease Maternal Grandmother      Thyroid Disease Maternal Grandfather      Thyroid Disease Paternal Grandmother      Thyroid Disease Paternal Grandfather      Thyroid Disease       Family history of headaches: no    Review of Systems:  Skin:  negative  Eyes: negative  Ears/Nose/Throat: negative  Respiratory: No shortness of breath, dyspnea on exertion, cough, or hemoptysis  Cardiovascular: negative  Gastrointestinal: negative  Genitourinary: negative  Musculoskeletal: negative  Neurologic: negative  Psychiatric: negative  Hematologic/Lymphatic/Immunologic: negative  Endocrine: negative    Physical Exam:  Vitals:    05/21/18 1326   BP: 130/86   Pulse: 94   SpO2: 100%   Weight: 54.4 kg (120 lb)     Exam:  Constitutional: healthy, alert and no distress  Head: normocephalic. Atraumatic.   Eyes: no redness or jaundice noted   ENT: oropharnx normal.  MMM.  Neck supple.    Cardiovascular: Negative JVD  Respiratory: Speaking in full sentences no accessory muscles use   gastrointestinal: soft, non-tender, normoactive bowel sounds   Skin: no suspicious lesions or rashes  Psychiatric: mentation appears normal and affect normal/bright    Musculoskeletal exam:  Gait/Station/Posture: wnl  Cervical spine: ROMwnl       Thoracic spine:  tenderness to deep palpation positive approximately T10-T12 Paresthesia over   dermatome on the right    Lumbar spine:     ROM: wnl (good     Neurologic exam:  CN:  Cranial nerves 2-12 are normal  Motor:  5/5 UE  Reflexes:     Biceps:     +2    Sensory:  (upper and lower extremities):   Light touch: Decreased   Allodynia: absent    Dysethesia: absent    Hyperalgesia: absent     Diagnostic tests:  MRI cervical reviewed    Assessment/Plan:  Sulema Murphy is a 57 year old female who presents with the complaints of right thoracic pain radiating to her anterior chest/abdomen.  Concern for thoracic radiculopathy versus postherpetic neuralgia versus myofascial muscle pain  Sulema was seen today for pain.    Diagnoses and all orders for this visit:    Thoracic neuralgia  -     MR Thoracic Spine w/o Contrast; Future    - Further procedures recommended:    - Consider trigger point injection    - Consider Thoracic epidural( reasonable  to start with thoracic epidural to rule out radiculopathy vs post herpetic neuralgia)   - Consider intercostal nerve block  - Medication Management:    - Consider start low dose muscle relaxer(zanaflex)   - consider starting low dose medication for neuropathic pain(100mg of Gabapentin tid)  - Diagnostic Studies: ordered  thoracic MRI   - Follow up: Pending decision on how to proceed.        Total time spent was 60 minutes, and more than 50% of face to face time was spent counseling and/or coordination of care regarding principles of multidisciplinary care and medication management right thoracic pain radiating to her anterior chest/abdomen.     Shashank Brown MD  [facility]

## 2018-05-21 NOTE — MR AVS SNAPSHOT
After Visit Summary   5/21/2018    Sulema Murphy    MRN: [id number]           Patient Information     Date Of Birth          1960        Visit Information        Provider Department      5/21/2018 1:30 PM Shashank Brown MD [facility]        Today's Diagnoses     Thoracic neuralgia    -  1      Care Instructions      - Further procedures recommended:    - Consider trigger point injection    - Consider Thoracic epidural( reasonable to start with thoracic MRI to rule out radiculopathy vs post herpetic neuralgia)   - Consider intercostal nerve block  - Medication Management:    - Consider starting  low dose muscle relaxer(zanaflex)   - consider starting low dose medication for neuropathic pain(100mg of Gabapentin tid)  - Diagnostic Studies: ordered  thoracic MRI   - Follow up: Pending decision on how to proceed.      ----------------------------------------------------------------  Nurse Triage line:  [phone]   Call this number with any questions or concerns. You may leave a detailed message anytime. Calls are typically returned Monday through Friday between 8 AM and 4:30 PM. We usually get back to you within 2 business days depending on the issue/request.       Medication refills:    For non-narcotic medications, call your pharmacy directly to request a refill. The pharmacy will contact the Pain Management Center for authorization. Please allow 3-4 days for these refills to be processed.     For narcotic refills, call the nurse triage line or send a [employer] message. Please contact us 7-10 days before your refill is due. The message MUST include the name of the specific medication(s) requested and how you would like to receive the prescription(s). The options are as follows:    Pain Clinic staff can mail the prescription to your pharmacy. Please tell us the name of the pharmacy.    You may pick the prescription up at the Pain Clinic (tell us the location) or  during a clinic visit with your pain provider    Pain Clinic staff can deliver the prescription to the [place] pharmacy in the clinic building. Please tell us the location.      Scheduling number: [phone].  Call this number to schedule or change appointments.    We believe regular attendance is key to your success in our program.    Any time you are unable to keep your appointment we ask that you call us at least 24 hours in advance to let us know. This will allow us to offer the appointment time to another patient.               Follow-ups after your visit        Future tests that were ordered for you today     Open Future Orders        Priority Expected Expires Ordered    MR Thoracic Spine w/o Contrast Routine  5/21/2019 5/21/2018            Who to contact     If you have questions or need follow up information about today's clinic visit or your schedule please contact [facility] directly at [phone].  Normal or non-critical lab and imaging results will be communicated to you by Ideacentrichart, letter or phone within 4 business days after the clinic has received the results. If you do not hear from us within 7 days, please contact the clinic through Energyt or phone. If you have a critical or abnormal lab result, we will notify you by phone as soon as possible.  Submit refill requests through GoNabit or call your pharmacy and they will forward the refill request to us. Please allow 3 business days for your refill to be completed.          Additional Information About Your Visit        IdeacentricharAktivito Information     GoNabit gives you secure access to your electronic health record. If you see a primary care provider, you can also send messages to your care team and make appointments. If you have questions, please call your primary care clinic.  If you do not have a primary care provider, please call [phone] and they will assist you.        Care EveryWhere ID     This is your Care EveryWhere ID. This  could be used by other organizations to access your [place] medical records  [id number]        Your Vitals Were     Pulse Pulse Oximetry BMI (Body Mass Index)             94 100% 20.44 kg/m2          Blood Pressure from Last 3 Encounters:   05/21/18 130/86   02/22/16 114/78   08/14/13 112/62    Weight from Last 3 Encounters:   05/21/18 54.4 kg (120 lb)   02/22/16 53.5 kg (118 lb)   08/14/13 54.4 kg (120 lb)               Primary Care Provider Office Phone # Fax #    [facility] [phone]       [address] [place] [address], [address]  [facility] [address]        Equal Access to Services     JOHANN BROUSSARD : Von hancocko Renu, waaxda luqadaha, qaybta kaalmada martínyadequan, mague rene. So [facility] [phone].    ATENCIÓN: Si habla español, tiene a julien disposición servicios gratuitos de asistencia lingüística. [facility] [phone].    We comply with applicable federal civil rights laws and [place] laws. We do not discriminate on the basis of race, color, national origin, age, disability, sex, sexual orientation, or gender identity.            Thank you!     Thank you for choosing [facility]  for your care. Our goal is always to provide you with excellent care. Hearing back from our patients is one way we can continue to improve our services. Please take a few minutes to complete the written survey that you may receive in the mail after your visit with us. Thank you!             Your Updated Medication List - Protect others around you: Learn how to safely use, store and throw away your medicines at www.disposemymeds.org.          This list is accurate as of 5/21/18  2:09 PM.  Always use your most recent med list.                   Brand Name Dispense Instructions for use Diagnosis    CALCIUM PO      Take  by mouth.        cycloSPORINE 0.05 % ophthalmic emulsion    RESTASIS     1 drop every 12 hours.        GLUCOSAMINE-CHONDROITIN PO       Take  by mouth.        METROLOTION 0.75 % Lotn   Generic drug:  metroNIDAZOLE      Apply thin layer to the affected area by topical route 2 times per day in the morning and evening        MULTI-VITAMINS PO      Take  by mouth.        SYNTHROID 137 MCG tablet   Generic drug:  levothyroxine     3 MONTHS    ONE TABLET DAILY IN THE MORNING    Pain in limb, Plantar fascial fibromatosis

## 2018-05-21 NOTE — PATIENT INSTRUCTIONS
- Further procedures recommended:    - Consider trigger point injection    - Consider Thoracic epidural (reasonable to start with thoracic MRI to rule out radiculopathy vs post herpetic neuralgia)   - Consider intercostal nerve block  - Medication Management:    - Consider starting  low dose muscle relaxer(zanaflex)   - consider starting low dose medication for neuropathic pain(100mg of Gabapentin tid)  - Diagnostic Studies: ordered  thoracic MRI   - Follow up: Pending decision on how to proceed.      ----------------------------------------------------------------  Nurse Triage line:  [phone]   Call this number with any questions or concerns. You may leave a detailed message anytime. Calls are typically returned Monday through Friday between 8 AM and 4:30 PM. We usually get back to you within 2 business days depending on the issue/request.       Medication refills:    For non-narcotic medications, call your pharmacy directly to request a refill. The pharmacy will contact the [facility] for authorization. Please allow 3-4 days for these refills to be processed.     For narcotic refills, call the nurse triage line or send a Eayun message. Please contact us 7-10 days before your refill is due. The message MUST include the name of the specific medication(s) requested and how you would like to receive the prescription(s). The options are as follows:    Pain Clinic staff can mail the prescription to your pharmacy. Please tell us the name of the pharmacy.    You may pick the prescription up at the Pain Clinic (tell us the location) or during a clinic visit with your pain provider    Pain Clinic staff can deliver the prescription to the [facility] in the clinic building. Please tell us the location.      Scheduling number: [phone].  Call this number to schedule or change appointments.    We believe regular attendance is key to your success in our program.    Any time you are unable to keep  your appointment we ask that you call us at least 24 hours in advance to let us know. This will allow us to offer the appointment time to another patient.

## 2018-05-22 ENCOUNTER — TRANSFERRED RECORDS (OUTPATIENT)
Dept: HEALTH INFORMATION MANAGEMENT | Facility: CLINIC | Age: 58
End: 2018-05-22

## 2018-05-23 ENCOUNTER — MYC MEDICAL ADVICE (OUTPATIENT)
Dept: PALLIATIVE MEDICINE | Facility: CLINIC | Age: 58
End: 2018-05-23

## 2018-05-23 DIAGNOSIS — M79.2 THORACIC NEURALGIA: Primary | ICD-10-CM

## 2018-05-23 NOTE — TELEPHONE ENCOUNTER
My chart sent from patient:    Jacqueline Brown, I had the MRI at [facility] at 7am on May 22, 2018.  they informed me the results should be available on the portal within 1-2 business days.   My contact number at work during business hours is [phone] and cell [phone] for next steps and planning    Selena CHRISTIANSONN-RN Care Coordinator  [facility]

## 2018-05-25 NOTE — TELEPHONE ENCOUNTER
MRI results reviewed. I am concerned her symptoms are secondary to postherpetic neuralgia.  I would recommend a thoracic epidural at this time.  Pending patient's decision on how to proceed I will place an order.

## 2018-05-25 NOTE — TELEPHONE ENCOUNTER
Routing to provider as MARQUISI. MRI available at [facility]. Please review and advise on plan of care.   Per OV:  - Further procedures recommended:                         - Consider trigger point injection                         - Consider Thoracic epidural( reasonable to start with thoracic epidural to rule out radiculopathy vs post herpetic neuralgia)                        - Consider intercostal nerve block  - Medication Management:                         - Consider start low dose muscle relaxer(zanaflex)                        - consider starting low dose medication for neuropathic pain(100mg of Gabapentin tid)  - Diagnostic Studies: ordered  thoracic MRI   - Follow up: Pending decision on how to proceed.    MRI THORACIC SPINE WITHOUT CONTRAST 5/22/2018 7:44 AM HISTORY: Thoracic radicular symptoms for nine months. TECHNIQUE: Multiplanar multisequence MRI of the thoracic spine without contrast. COMPARISON: Scan dated 3/11/2013 FINDINGS: There is mild curvature of the thoracic spine convex towards the right. The thoracic spinal cord and thecal sac appear normal. All the thoracic discs appear normal. There are degenerative changes in the facet joints with a small joint effusion in the T9-T10 facet joints All the neural foramina and facet joints appear normal. The paraspinous soft tissues appear normal. No fractures are present. IMPRESSION: 1. No thoracic disc herniations are seen. 2. Degenerative change is seen in the facet joints with small joint effusions in the T9-T10 facet joints.

## 2018-05-29 NOTE — TELEPHONE ENCOUNTER
Called patient to relay information. Left VM for patient to call triage back with a good time to reach her at.     Selena CHRISTIANSONN-RN Care Coordinator  [facility]

## 2018-05-30 NOTE — TELEPHONE ENCOUNTER
Pt LM at 1608 on 5/29 stating she was returning a call. She can be reached between 9:30AM and 4PM -- phone #[phone]       Marybeth Kim    [facility]

## 2018-05-30 NOTE — TELEPHONE ENCOUNTER
"Called pt.  Relayed \"Impression\" for MRI and Dr. Brown's message re: offering thoracic epidural injection.  Pt would like to try it. She wanted Dr. Brown to know that she had a CFS leak with her  and \"it was awful.\"  They didn't want to do a blood patch at that time due to low HGB. She has had an epidural since that time but just wanted the provider to be aware of the history of a CSF leak. Let her know that Dr. Brown will put in the order and that someone will call her to schedule the injection.     Pt is wondering about the degenerative facet joints; she is not surprised about that and is wondering if she should be referred to someone to start something to help her bone density. States that she had a deca scan when she was 48 and the OB/GYN suggested that she start medications at that time but [facility] was not willing to do that. States that she has been trying to manage without prescription medications.  She exercises regularly, as much as she can tolerate with the pain, and takes calcium.  She would like Dr. Brown's opinion about this.     Initiated the order for the thoracic epidural injection and will route to Dr. Brown for review and completion.     Shavon Morris, SAMMYN, RN-BC  Patient Care Supervisor/Care Coordinator  [facility]        "

## 2018-05-30 NOTE — TELEPHONE ENCOUNTER
Order placed.  Will discuss risk of postdural puncture headache is low.  Especially given procedure is done under live fluoroscopy.

## 2018-05-31 NOTE — TELEPHONE ENCOUNTER
Pre-screening Questions for Radiology Injections:    Injection to be done at which interventional clinic site? [facility], instruct patient to arrive 30 minutes before the scheduled appointment time.     Procedure ordered by sebastián    Procedure ordered? Thoracic and  Epidural Steroid Injection    What insurance would patient like us to bill for this procedure? Preferred one      Worker's comp or MVA (motor vehicle accident) -Any injection DO NOT SCHEDULE and route to Brenda Lopez.      [employer] - For SI joint injections, DO NOT SCHEDULE and route Eliza Nova. [employer] NO PA REQUIRED EFFECTIVE 11/1/2017      [employer]- MBB's must be scheduled at LEAST two weeks apart      [employer] - Any injection besides hip/shoulder/knee joint DO NOT SCHEDULE and route to Eliza Nova. She will obtain PA and call pt back to schedule procedure or notify pt of denial.        [employer]-Route to Eliza for review    Any chance of pregnancy? Not Applicable   If YES, do NOT schedule and route to RN pool    Is an  needed? No     Patient has a drive home? (mandatory) YES:     Is patient taking any blood thinners (plavix, coumadin, jantoven, warfarin, heparin, pradaxa or dabigatran )? No   If hold needed, do NOT schedule, route to RN pool     Is patient taking any aspirin products? No     If more than 325mg/day do NOT schedule; route to RN pool     For CERVICAL procedures, hold all aspirin products for 6 days.      Does the patient have a bleeding or clotting disorder? No     If YES, okay to schedule AND route to RN nurse pool    **For any patients with platelet count <100, must be forwarded to provider**    Is patient diabetic?  No  If YES, have them bring their glucometer.    Does patient have an active infection or treated for one within the past week? No     Is patient currently taking any antibiotics?  No     For patients on chronic, preventative, or prophylactic  antibiotics, procedures may be scheduled.     For patients on antibiotics for active or recent infection:    Rigoberto Tristan Burton, Snitzer-antibiotic course must have been completed for 4 days    Is patient currently taking any steroid medications? (i.e. Prednisone, Medrol)  No     For patients on steroid medications:    Rigoberto Tristan Burton, Snitzer-steroid course must have been completed for 4 days    Reviewed with patient:  If you are started on any steroids or antibiotics between now and your appointment, you must contact us because it may affect our ability to perform your procedure.  Yes    Is patient actively being treated for cancer or immunocompromised? No  If YES, do NOT schedule and route to RN pool     Are you able to get on and off an exam table with minimal or no assistance? Yes  If NO, do NOT schedule and route to RN pool    Are you able to roll over and lay on your stomach with minimal or no assistance? Yes  If NO, do NOT schedule and route to RN pool     Any allergies to contrast dye, iodine, shellfish, or numbing and steroid medications? No  If YES, route to RN pool AND add allergy information to appointment notes    Allergies: Codeine; Betadine plus; Penicillins; Sulfa drugs; and Tetracycline      Has the patient had a flu shot or any other vaccinations within 7 days before or after the procedure.  No     Does patient have an MRI/CT?  YES:   (SI joint, hip injections, lumbar sympathetic blocks, and stellate ganglion blocks do not require an MRI)    Was the MRI done w/in the last 3 years?  Yes    Was MRI done at [place]? Yes      If not, where was it done? N/A       If MRI was not done at [place], [facility] or [facility] do NOT schedule and route to nursing.  If pt has an imaging disc, the injection may be scheduled but pt has to bring disc to appt. If they show up w/out disc the injection cannot be done    Reminders (please tell patient if applicable):       Instructed pt  to arrive 30 minutes early for IV start if this is for a cervical procedure, ALL sympathetic (stellate ganglion, hypogastric, or lumbar sympathetic block) and all sedation procedures (RFA, spinal cord stimulation trials).  Not Applicable   -IVs are not routinely placed for Dr. Barlow cervical cases   -Dr. Brown: IVs for cervical ESIs and cervical TBDs (not CMBBs/facet inj)      If NPO for sedation, informed patient that it is okay to take medications with sips of water (except if they are to hold blood thinners).  Not Applicable   *DO take blood pressure medication if it is prescribed*      If this is for a cervical TEDDY, informed patient that aspirin needs to be held for 6 days.   Not Applicable      For all patients not having spinal cord stimulator (SCS) trials or radiofrequency ablations (RFAs), informed patient:    IV sedation is not provided for this procedure.  If you feel that an oral anti-anxiety medication is needed, you can discuss this further with your referring provider or primary care provider.  The Pain Clinic provider will discuss specifics of what the procedure includes at your appointment.  Most procedures last 10-20 minutes.  We use numbing medications to help with any discomfort during the procedure.  Not Applicable      Do not schedule procedures requiring IV placement in the first appointment of the day or first appointment after lunch. Do NOT schedule at 0745, 0815 or 1245.       For patients 85 or older we recommend having an adult stay w/ them for the remainder of the day.       Does the patient have any questions?  Not Applicable  Lupe Perez  [facility]

## 2018-05-31 NOTE — TELEPHONE ENCOUNTER
LM for patient to schedule Thoracic TEDDY      Marybeth Kim    [place] [facility]

## 2018-06-18 ENCOUNTER — RADIOLOGY INJECTION OFFICE VISIT (OUTPATIENT)
Dept: PALLIATIVE MEDICINE | Facility: CLINIC | Age: 58
End: 2018-06-18
Payer: COMMERCIAL

## 2018-06-18 ENCOUNTER — RADIANT APPOINTMENT (OUTPATIENT)
Dept: GENERAL RADIOLOGY | Facility: CLINIC | Age: 58
End: 2018-06-18
Attending: PAIN MEDICINE
Payer: COMMERCIAL

## 2018-06-18 VITALS — OXYGEN SATURATION: 98 % | DIASTOLIC BLOOD PRESSURE: 89 MMHG | SYSTOLIC BLOOD PRESSURE: 122 MMHG | HEART RATE: 73 BPM

## 2018-06-18 DIAGNOSIS — M47.24 OSTEOARTHRITIS OF SPINE WITH RADICULOPATHY, THORACIC REGION: Primary | ICD-10-CM

## 2018-06-18 DIAGNOSIS — M79.2 THORACIC NEURALGIA: ICD-10-CM

## 2018-06-18 DIAGNOSIS — B02.29 POST HERPETIC NEURALGIA: ICD-10-CM

## 2018-06-18 PROCEDURE — 62321 NJX INTERLAMINAR CRV/THRC: CPT | Performed by: PAIN MEDICINE

## 2018-06-18 NOTE — NURSING NOTE
Pre-procedure Intake    Have you been fasting? NA    If yes, for how long?     Are you taking a prescribed blood thinner such as coumadin, Plavix, Xarelto?    No    If yes, when did you take your last dose?     Do you take aspirin?  No    If cervical procedure, have you held aspirin for 6 days?   NA    Do you have any allergies to contrast dye, iodine, steroid and/or numbing medications?  NO    Are you currently taking antibiotics or have an active infection?  NO    Have you had a fever/elevated temperature within the past week? NO    Are you currently taking oral steroids? NO    Do you have a ? Yes       Are you pregnant or breastfeeding?  NO    Are the vital signs normal?  Yes

## 2018-06-18 NOTE — PROGRESS NOTES
Pre procedure Diagnosis: Thoracic radiculopathy  Post procedure Diagnosis: Same  Procedure performed: Thoracic interlaminar epidural steroid injection   Anesthesia: None  Complications: None  Operators: Shashank Brown MD     Indications:   Pain history:  Of note patient broke her ribs in 1994 while pregnant.  She also had a breast reduction at the end of 1994 but no pain at that time.  Patient had shingles in 2001.  Of note patient denies having pain at that time.  Of note patient had a rash but no pain at that time.  Her current distribution of her pain is similar to where her rash was.  Although had no rash this time.  The pain is been getting progressively worse over the last 9 months.  The pain is a sharp shooting pain.  The pain is intermittent.  The pain is worse with turning and reaching.  The pain is slightly better with stretching.  The pain is slightly better with rest.  She notes numbness in the distribution of her pain to light touch.  Although, it is not painful to light touch.  She denies any acute injury to the area.  She denies any inciting event 9 months ago.  She has tried over-the-counter capsaicin and lidocaine   Without relief.  She denies any progressive weakness.  She denies any neck pain.  Overall the pain greatly affects her ADLs.      Of note patient did take acyclovir when she initially had the shingles in 2001      MRI reviewed from [facility]  Options/alternatives, benefits and risks were discussed with the patient including but not limited to bleeding, infection, no pain relief, tissue trauma, exposure to radiation, reaction to medications, spinal cord injury, dural puncture, weakness, numbness and headache.  Questions were answered to her satisfaction and she wishes to proceed. Voluntary informed consent was obtained and signed.     Vitals were reviewed: Yes  Allergies were reviewed:  Yes   Medications were reviewed:  Yes   Pre-procedure pain score: 7/10    Procedure:  The  patient's medical history, medications, and allergies were reviewed and reconciled.  After obtaining signed informed consent, the patient was brought into the procedure suite and was placed in a prone position on the procedure table.   A Pause for the Cause was performed.  Patient was prepped and draped in the usual sterile fashion.     The T10-11 interspace was identified with AP fluoroscopy.  A total of 5ml of 1% lidocaine was used to anesthetize the skin and subcutaneous tissues for a right of midline approach.    A 20gauge 3.5inch Touhy needle was advanced utilizing intermittent AP and Lateral fluoroscopy and air for loss of resistance.  The epidural space was encountered on the first pass without difficulties.  Aspiration for blood and CSF was negative.  Needle position was verified by injecting 1 ml of Omnipaque utilizing real-time fluoroscopy that showed good needle placement and epidural spread without signs of intravascular or intrathecal uptake.  Omnipaque wasted:  9 ml.    Then, after repeated negative aspiration for blood or CSF, a combination of Kenalog 40 mg, Bupivicaine 0.25% 2 ml, diluted with 3 ml of normal saline to a total injectate volume of 6 ml was injected into the epidural space in a slow and incremental fashion and the needle was restyletted and withdrawn.  All injected medications were preservative free.    The injection site was cleaned and a sterile dressing was applied.    The patient tolerated the procedure well without complications and was taken to the recovery room for continued observation.    Images were saved to PACS.    Post-procedure pain score: 5/10  Follow-up includes:   -f/u phone call in one week  -f/u with referring provider     Shashank Brown MD  [facility] [place]

## 2018-06-18 NOTE — PATIENT INSTRUCTIONS
[facility] Procedure Discharge Instructions    Today you saw:     Dr. Shashank Brown    Your procedure:  Epidural steroid injection       Medications used:    Bupivacaine (anesthetic)   Kenalog (steroid)  Omnipaque (contrast)              Be cautious when walking as numbness and/or weakness in the legs may occur up to 6-8 hours after the procedure due to effect of the local anesthetic    Do not drive for 6 hours. The effect of the local anesthetic could slow your reflexes.     Avoid strenuous activity for the first 24 hours. You may resume your regular activities after that.     You may shower, however avoid swimming, tub baths or hot tubs for 24 hours following your procedure    You may have a mild to moderate increase in pain for several days following the injection.      You may use ice packs for 10-15 minutes, 3 to 4 times a day at the injection site for comfort    Do not use heat to painful areas for 6 to 8 hours. This will give the local anesthetic time to wear off and prevent you from accidentally burning your skin.    You may use anti-inflammatory medications (such as Ibuprofen/Advil or Aleve) or Tylenol for pain control if necessary    With diabetes, check your blood sugar more frequently than usual as your blood sugar may be higher than normal for 10-14 days following a steroid injection. Contact your doctor who manages your diabetes if your blood sugar is higher than usual    It may take up to 14 days for the steroid medication to start working although you may feel the effect as early as a few days after the procedure.     Follow up with your referring provider in 2-3 weeks      If you experience any of the following, call the pain center line during work hours at [phone] or on-call physician after hours at [phone]:  -Fever over 100 degree F  -Swelling, bleeding, redness, drainage, warmth at the injection site  -Progressive weakness or numbness in your legs or arms  -Loss of bowel  or bladder function  -Unusual headache that is not relieved by Tylenol or your regular headache medication  -Unusual new onset of pain that is not improving    Phone #s:  Nurse triage line for general questions: [phone]

## 2018-06-18 NOTE — Clinical Note
Patient was also interested in being seen in clinic.  Would need order for comprehensive evaluation and treatment.

## 2018-06-18 NOTE — NURSING NOTE
Discharge Information    IV Discontiued Time:  NA    Amount of Fluid Infused:  NA    Discharge Criteria = When patient returns to baseline or as per MD order    Consciousness:  Pt is fully awake    Circulation:  BP +/- 20% of pre-procedure level    Respiration:  Patient is able to breathe deeply    O2 Sat:  Patient is able to maintain O2 Sat >92% on room air    Activity:  Moves 4 extremities on command    Ambulation:  Patient is able to stand and walk or stand and pivot into wheelchair    Dressing:  Clean/dry or No Dressing    Notes:   Discharge instructions and AVS given to patient    Patient meets criteria for discharge?  YES    Admitted to PCU?  No    Responsible adult present to accompany patient home?  Yes    Signature/Title:    Selena Farris RN Care Coordinator  [facility] [place]

## 2018-06-18 NOTE — MR AVS SNAPSHOT
After Visit Summary   6/18/2018    Sulema Murphy    MRN: [id number]           Patient Information     Date Of Birth          1960        Visit Information        Provider Department      6/18/2018 8:45 AM Shashank Brown MD [facility]        Care Instructions    [facility] Procedure Discharge Instructions    Today you saw:     Dr. Shashank Brown    Your procedure:  Epidural steroid injection       Medications used:    Bupivacaine (anesthetic)   Kenalog (steroid)  Omnipaque (contrast)              Be cautious when walking as numbness and/or weakness in the legs may occur up to 6-8 hours after the procedure due to effect of the local anesthetic    Do not drive for 6 hours. The effect of the local anesthetic could slow your reflexes.     Avoid strenuous activity for the first 24 hours. You may resume your regular activities after that.     You may shower, however avoid swimming, tub baths or hot tubs for 24 hours following your procedure    You may have a mild to moderate increase in pain for several days following the injection.      You may use ice packs for 10-15 minutes, 3 to 4 times a day at the injection site for comfort    Do not use heat to painful areas for 6 to 8 hours. This will give the local anesthetic time to wear off and prevent you from accidentally burning your skin.    You may use anti-inflammatory medications (such as Ibuprofen/Advil or Aleve) or Tylenol for pain control if necessary    With diabetes, check your blood sugar more frequently than usual as your blood sugar may be higher than normal for 10-14 days following a steroid injection. Contact your doctor who manages your diabetes if your blood sugar is higher than usual    It may take up to 14 days for the steroid medication to start working although you may feel the effect as early as a few days after the procedure.     Follow up with your referring provider in 2-3  weeks      If you experience any of the following, call the pain center line during work hours at [phone] or on-call physician after hours at [phone]:  -Fever over 100 degree F  -Swelling, bleeding, redness, drainage, warmth at the injection site  -Progressive weakness or numbness in your legs or arms  -Loss of bowel or bladder function  -Unusual headache that is not relieved by Tylenol or your regular headache medication  -Unusual new onset of pain that is not improving    Phone #s:  Nurse triage line for general questions: [phone]          Follow-ups after your visit        Who to contact     If you have questions or need follow up information about today's clinic visit or your schedule please contact [facility] directly at [phone].  Normal or non-critical lab and imaging results will be communicated to you by MyChart, letter or phone within 4 business days after the clinic has received the results. If you do not hear from us within 7 days, please contact the clinic through [employer] or phone. If you have a critical or abnormal lab result, we will notify you by phone as soon as possible.  Submit refill requests through [employer] or call your pharmacy and they will forward the refill request to us. Please allow 3 business days for your refill to be completed.          Additional Information About Your Visit        [employer]al Information     [employer] gives you secure access to your electronic health record. If you see a primary care provider, you can also send messages to your care team and make appointments. If you have questions, please call your primary care clinic.  If you do not have a primary care provider, please call [phone] and they will assist you.        Care EveryWhere ID     This is your Care EveryWhere ID. This could be used by other organizations to access your [place] medical records  [id number]        Your Vitals Were     Pulse Pulse Oximetry                67 99%            Blood Pressure from Last 3 Encounters:   06/18/18 134/88   05/21/18 130/86   02/22/16 114/78    Weight from Last 3 Encounters:   05/21/18 54.4 kg (120 lb)   02/22/16 53.5 kg (118 lb)   08/14/13 54.4 kg (120 lb)              Today, you had the following     No orders found for display       Primary Care Provider Office Phone # Fax #    [facility] [phone]       [address] [place] [address], [id number]  [place] [address]        Equal Access to Services     [facility] : Hadii ina Sears, waronda sherrill, celsota krissy brantley, mague martin . So [facility] [phone].    ATENCIÓN: Si habla español, tiene a julien disposición servicios gratuitos de asistencia lingüística. [facility] [phone].    We comply with applicable federal civil rights laws and [place] laws. We do not discriminate on the basis of race, color, national origin, age, disability, sex, sexual orientation, or gender identity.            Thank you!     Thank you for choosing [facility]  for your care. Our goal is always to provide you with excellent care. Hearing back from our patients is one way we can continue to improve our services. Please take a few minutes to complete the written survey that you may receive in the mail after your visit with us. Thank you!             Your Updated Medication List - Protect others around you: Learn how to safely use, store and throw away your medicines at www.disposemymeds.org.          This list is accurate as of 6/18/18  9:03 AM.  Always use your most recent med list.                   Brand Name Dispense Instructions for use Diagnosis    CALCIUM PO      Take  by mouth.        cycloSPORINE 0.05 % ophthalmic emulsion    RESTASIS     1 drop every 12 hours.        GLUCOSAMINE-CHONDROITIN PO      Take  by mouth.        METROLOTION 0.75 % Lotn   Generic drug:  metroNIDAZOLE      Apply thin layer to the affected area by  topical route 2 times per day in the morning and evening        MULTI-VITAMINS PO      Take  by mouth.        SYNTHROID 137 MCG tablet   Generic drug:  levothyroxine     3 MONTHS    ONE TABLET DAILY IN THE MORNING    Pain in limb, Plantar fascial fibromatosis

## 2018-06-26 ENCOUNTER — TELEPHONE (OUTPATIENT)
Dept: PALLIATIVE MEDICINE | Facility: CLINIC | Age: 58
End: 2018-06-26

## 2018-06-26 NOTE — TELEPHONE ENCOUNTER
Patient had a Thoracic interlaminar epidural steroid injection on 6/18/16.  Called patient for an update.      Left message that we were calling for an update about how she was doing after the injection.  LM that if she has any problems or questions to call the clinic at [phone].

## 2018-07-02 ENCOUNTER — THERAPY VISIT (OUTPATIENT)
Dept: CHIROPRACTIC MEDICINE | Facility: CLINIC | Age: 58
End: 2018-07-02
Payer: COMMERCIAL

## 2018-07-02 ENCOUNTER — TELEPHONE (OUTPATIENT)
Dept: PALLIATIVE MEDICINE | Facility: CLINIC | Age: 58
End: 2018-07-02

## 2018-07-02 DIAGNOSIS — M54.6 CHRONIC BILATERAL THORACIC BACK PAIN: Primary | ICD-10-CM

## 2018-07-02 DIAGNOSIS — M54.12 CERVICOBRACHIAL NEURALGIA: ICD-10-CM

## 2018-07-02 DIAGNOSIS — M25.511 CHRONIC RIGHT SHOULDER PAIN: ICD-10-CM

## 2018-07-02 DIAGNOSIS — G89.29 CHRONIC BILATERAL THORACIC BACK PAIN: Primary | ICD-10-CM

## 2018-07-02 DIAGNOSIS — M54.2 CERVICALGIA: ICD-10-CM

## 2018-07-02 DIAGNOSIS — M99.02 THORACIC SEGMENT DYSFUNCTION: ICD-10-CM

## 2018-07-02 DIAGNOSIS — G89.29 CHRONIC RIGHT SHOULDER PAIN: ICD-10-CM

## 2018-07-02 DIAGNOSIS — G44.221 CHRONIC TENSION-TYPE HEADACHE, INTRACTABLE: ICD-10-CM

## 2018-07-02 PROCEDURE — 97810 ACUP 1/> WO ESTIM 1ST 15 MIN: CPT | Mod: GA | Performed by: CHIROPRACTOR

## 2018-07-02 PROCEDURE — 98940 CHIROPRACT MANJ 1-2 REGIONS: CPT | Mod: AT | Performed by: CHIROPRACTOR

## 2018-07-03 NOTE — TELEPHONE ENCOUNTER
Called patient and LM relay message that if she would like to be seen clinically to request order for eval/management through her PCP office. She is currently scheduled for 08/6/18 for follow up (current order does not support this management and eval as there is no PCP to return patient to, order originally placed by a PT).      Carolee CHRISTIANSONN, RN Care Coordinator  [facility]

## 2018-07-03 NOTE — TELEPHONE ENCOUNTER
Please have patient contact her PCP to place order for full comprehensive evaluation if interested.

## 2018-07-03 NOTE — PROGRESS NOTES
VISIT #:5/2018 Chronic pain- mid back and R shoulder/paresthesias    SUBJECTIVE:   The pt returns after receiving injections in the R thoracic area and paraspinals from pain management. There was no significant improvement. Today she reports R sided neck pain and HA with sx of R thoracic pain radiating in the R rib. She denies weakness in the extremities or other unusual sx.       OBJECTIVE: C2, C5 R T5,  T9, T11     suboccipitals, R SCM ( lesser suboccipital nerve), R LR levator scapula, B QL, R longus coli, R teres major minor/lastissimus dorsi.     ASSESSMENT/PLAN :  No change from pain management, no improvement since the previous treatment    Diagnoses (See diagnoses listed above in encounter notes.)    Response to previous treatment:   No increase in sx, pt stable    Chiropractic manipulation: C2, C5 T5, T9, T11  Prone, diversified       Modalities: ACP: denzelatchristie points in the cervical and R thoracic area, valeriy points in the R thoracic paraspinals   15 minutes prone      Therapeutic exercise: 1st rib stretching for 3 minutes-PNF stretching     FU: 1 week with check up   FU with ACP to mid back    Chronic pain. pt stable

## 2018-07-12 NOTE — TELEPHONE ENCOUNTER
VM left today at: 10:29 am    Pt calling to find fax # to send referral to our clinic.     Returned patient's call and LVM with our phone number to discuss referral and future appt that was scheduled in error.     When patient calls back please let her know we are only accepting internal referrals and that her appt for 8/6 needs to be cx.       Brenda AGUIAR    [facility] [place]

## 2018-08-02 NOTE — TELEPHONE ENCOUNTER
Received call from patient who wanted to make sure that her appointment is not cancelled yet and wants to make sure she can be seen. Patient transferred to supervisor's voicemail as they have had previous discussion about referral issues.      Marybeth Kim    [facility]

## 2018-08-03 NOTE — TELEPHONE ENCOUNTER
Reviewed with Medical Director and we will okay Consult Eval with Dr. Brown.  We are making an exception as the patient does not fall into our criteria which is referrals are to come form a [facility] PCP treating out of a [facility] Clinic.  Pain provider to provide a consult and determine next steps which will may include reaching out to patients PCP.      Patient ok to schedule a 60 min Consult with Dr. Brown.  The 8/6 appointment needs to be cancelled and rescheduled as an Evaluation which includes sending out a packet.  Schedule with Dr. Brown in [place] once schedule is open.       Mayi Jaquez

## 2018-08-24 ENCOUNTER — TELEPHONE (OUTPATIENT)
Dept: PALLIATIVE MEDICINE | Facility: CLINIC | Age: 58
End: 2018-08-24

## 2018-08-24 ENCOUNTER — THERAPY VISIT (OUTPATIENT)
Dept: CHIROPRACTIC MEDICINE | Facility: CLINIC | Age: 58
End: 2018-08-24
Payer: COMMERCIAL

## 2018-08-24 DIAGNOSIS — M99.02 THORACIC SEGMENT DYSFUNCTION: ICD-10-CM

## 2018-08-24 DIAGNOSIS — M54.2 CERVICALGIA: ICD-10-CM

## 2018-08-24 DIAGNOSIS — M54.12 CERVICOBRACHIAL NEURALGIA: Primary | ICD-10-CM

## 2018-08-24 DIAGNOSIS — G89.29 CHRONIC BILATERAL THORACIC BACK PAIN: ICD-10-CM

## 2018-08-24 DIAGNOSIS — M25.511 CHRONIC RIGHT SHOULDER PAIN: ICD-10-CM

## 2018-08-24 DIAGNOSIS — G44.221 CHRONIC TENSION-TYPE HEADACHE, INTRACTABLE: ICD-10-CM

## 2018-08-24 DIAGNOSIS — M54.6 CHRONIC BILATERAL THORACIC BACK PAIN: ICD-10-CM

## 2018-08-24 DIAGNOSIS — G89.29 CHRONIC RIGHT SHOULDER PAIN: ICD-10-CM

## 2018-08-24 PROCEDURE — 97810 ACUP 1/> WO ESTIM 1ST 15 MIN: CPT | Mod: GA | Performed by: CHIROPRACTOR

## 2018-08-24 PROCEDURE — 98940 CHIROPRACT MANJ 1-2 REGIONS: CPT | Mod: AT | Performed by: CHIROPRACTOR

## 2018-08-24 NOTE — TELEPHONE ENCOUNTER
Called patient to discuss future plan of care with clinic, LVM for patient to call back.     [place] CHRISTIANSONN-RN Care Coordinator  [facility]

## 2018-08-24 NOTE — TELEPHONE ENCOUNTER
Nursing to contact patient to determine location of choice-Dr. Brown in [place] or establish with new provider in [place].     Last Note:05/21/18:Follow up: Pending decision on how to proceed.      Carolee CHRISTIANSONN, RN Care Coordinator  [facility]

## 2018-08-24 NOTE — PROGRESS NOTES
VISIT #:6/2018 Chronic pain- mid back and R shoulder/paresthesias    SUBJECTIVE:   The pt returns with 20% improvement since initial presentation after the thoracic injections. She is having difficulty twisting and raising the arms overhead. Pain is radiating in the R lateral arm and thoracic area. She woke with a moderate HA and migraines. She denies weakness or other unusual sx.       OBJECTIVE: C2, C5 R T5,  T9, T11     suboccipitals, R SCM ( lesser suboccipital nerve), R LR levator scapula, B QL, R longus coli, R teres major minor/lastissimus dorsi.     ASSESSMENT/PLAN :  No change from pain management, no improvement since the previous treatment    Diagnoses (See diagnoses listed above in encounter notes.)    Response to previous treatment:   No increase in sx, pt stable    Chiropractic manipulation: C2, C5 T5, T9, T11  Prone, diversified       Modalities: ACP: kristine points in the cervical and R thoracic area, valeriy points in the R thoracic paraspinals   15 minutes prone      Therapeutic exercise: 1st rib stretching for 3 minutes-PNF stretching     FU: 1 week with check up   FU with ACP to mid back    Chronic pain. pt stable

## 2018-08-28 NOTE — TELEPHONE ENCOUNTER
Chart check: patient is already scheduled in [place]. Closing    Carolee CHRISTIANSONN, RN Care Coordinator  [facility]

## 2018-09-10 ENCOUNTER — THERAPY VISIT (OUTPATIENT)
Dept: CHIROPRACTIC MEDICINE | Facility: CLINIC | Age: 58
End: 2018-09-10
Payer: COMMERCIAL

## 2018-09-10 DIAGNOSIS — M54.12 CERVICOBRACHIAL NEURALGIA: ICD-10-CM

## 2018-09-10 DIAGNOSIS — M25.511 CHRONIC RIGHT SHOULDER PAIN: ICD-10-CM

## 2018-09-10 DIAGNOSIS — G89.29 CHRONIC RIGHT SHOULDER PAIN: ICD-10-CM

## 2018-09-10 DIAGNOSIS — M54.6 CHRONIC BILATERAL THORACIC BACK PAIN: Primary | ICD-10-CM

## 2018-09-10 DIAGNOSIS — M54.2 CERVICALGIA: ICD-10-CM

## 2018-09-10 DIAGNOSIS — M99.02 THORACIC SEGMENT DYSFUNCTION: ICD-10-CM

## 2018-09-10 DIAGNOSIS — M54.14 THORACIC NEURITIS: ICD-10-CM

## 2018-09-10 DIAGNOSIS — G44.221 CHRONIC TENSION-TYPE HEADACHE, INTRACTABLE: ICD-10-CM

## 2018-09-10 DIAGNOSIS — G89.29 CHRONIC BILATERAL THORACIC BACK PAIN: Primary | ICD-10-CM

## 2018-09-10 PROCEDURE — 97810 ACUP 1/> WO ESTIM 1ST 15 MIN: CPT | Mod: GA | Performed by: CHIROPRACTOR

## 2018-09-10 PROCEDURE — 98941 CHIROPRACT MANJ 3-4 REGIONS: CPT | Mod: AT | Performed by: CHIROPRACTOR

## 2018-09-10 NOTE — PROGRESS NOTES
VISIT #:7/2018 Chronic pain- mid back and R shoulder/paresthesias    SUBJECTIVE:   The pt returns with slight improvement in the R thoracic area. She will be seeing pain management for a series of injections in the thoracic area. She states for the past several days she has been waking with B hand numbness. Due to walking for extended periods this weekend, she notes L sided hip pain. The pt denies improvement in HA sx. She denies other unusual sx.     OBJECTIVE: C2, C5 R T5,  T9, T11, L5, SACRUM, L PSIS      suboccipitals, R SCM ( lesser suboccipital nerve), R LR levator scapula, B QL, R longus coli, R teres major minor/lastissimus dorsi.     ASSESSMENT/PLAN :  No change from pain management, no improvement since the previous treatment    Diagnoses (See diagnoses listed above in encounter notes.)    Response to previous treatment:   No increase in sx, pt stable    Chiropractic manipulation:  68527- C2, C5 R T5,  T9, T11, L5, SACRUM, L PSIS   Prone, diversified       Modalities: ACP: denzelatjaniceachi points in the cervical and R thoracic area, valeriy points in the R thoracic paraspinals   15 minutes prone      Therapeutic exercise: 1st rib stretching for 3 minutes-PNF stretching     FU: 1 week with check up   FU with ACP to mid back    Chronic pain. pt stable

## 2018-10-01 ENCOUNTER — OFFICE VISIT (OUTPATIENT)
Dept: PODIATRY | Facility: CLINIC | Age: 58
End: 2018-10-01
Payer: COMMERCIAL

## 2018-10-01 ENCOUNTER — TELEPHONE (OUTPATIENT)
Dept: PODIATRY | Facility: CLINIC | Age: 58
End: 2018-10-01

## 2018-10-01 VITALS
BODY MASS INDEX: 20.59 KG/M2 | SYSTOLIC BLOOD PRESSURE: 130 MMHG | HEART RATE: 76 BPM | OXYGEN SATURATION: 99 % | DIASTOLIC BLOOD PRESSURE: 89 MMHG | WEIGHT: 120.9 LBS

## 2018-10-01 DIAGNOSIS — M79.2 THORACIC NEURALGIA: ICD-10-CM

## 2018-10-01 DIAGNOSIS — M79.18 MYOFASCIAL MUSCLE PAIN: Primary | ICD-10-CM

## 2018-10-01 PROCEDURE — 99214 OFFICE O/P EST MOD 30 MIN: CPT | Performed by: PAIN MEDICINE

## 2018-10-01 RX ORDER — MELOXICAM 7.5 MG/1
7.5 TABLET ORAL DAILY PRN
Qty: 90 TABLET | Refills: 1 | Status: SHIPPED | OUTPATIENT
Start: 2018-10-01 | End: 2019-01-28 | Stop reason: ALTCHOICE

## 2018-10-01 ASSESSMENT — PAIN SCALES - GENERAL: PAINLEVEL: SEVERE PAIN (6)

## 2018-10-01 NOTE — PATIENT INSTRUCTIONS
- Further procedures recommended:    - ordered trigger point injection will need ultrasound     - Consider intercostal nerve block  - Medication Management:    - Will start Mobic 7.5mg daily as needed. Do not take motrin/naproxen/NSAIDS while taking this medication    - Consider start low dose muscle relaxer(zanaflex)- Patient will decide     - Reasonable to pursue MDT- patient will call with information    - Follow up:    -1-2 months after procedure      ----------------------------------------------------------------  Clinic Number:  [phone]   Call this number with any questions about your care and for scheduling assistance. Calls are returned Monday through Friday between 8 AM and 4:30 PM. We usually get back to you within 2 business days depending on the issue/request.       Medication refills:    For non-narcotic medications, call your pharmacy directly to request a refill. The pharmacy will contact the [facility] for authorization. Please allow 3-4 days for these refills to be processed.     For narcotic refills, call the clinic number or send a PopUp message. Please contact us 7-10 days before your refill is due. The message MUST include the name of the specific medication(s) requested and how you would like to receive the prescription(s). The options are as follows:    Pain Clinic staff can mail the prescription to your pharmacy. Please tell us the name of the pharmacy.    You may pick the prescription up at the Pain Clinic (tell us the location) or during a clinic visit with your pain provider    Pain Clinic staff can deliver the prescription to the [place] pharmacy in the clinic building. Please tell us the location.      We believe regular attendance is key to your success in our program.    Any time you are unable to keep your appointment we ask that you call us at least 24 hours in advance to let us know. This will allow us to offer the appointment time to another patient.

## 2018-10-01 NOTE — LETTER
10/1/2018         RE: Sulema Murphy  [address] [place] [address]        Dear Colleague,    Thank you for referring your patient, Sulema Murphy, to the [facility]. Please see a copy of my visit note below.    [facility] follow up    Chief Complaint:    Chief Complaint   Patient presents with     Pain     recs  - Further procedures recommended:    - Consider trigger point injection    - Consider Thoracic epidural( reasonable to start with thoracic epidural to rule out radiculopathy vs post herpetic neuralgia)   - Consider intercostal nerve block  - Medication Management:    - Consider start low dose muscle relaxer(zanaflex)   - consider starting low dose medication for neuropathic pain(100mg of Gabapentin tid)  - Diagnostic Studies: ordered  thoracic MRI   - Follow up: Pending decision on how to proceed.    Interval:  Thoracic epidural 6/18.  The patient notes approximately 20% relief with her thoracic epidural.  She continues to have mid thoracic pain radiating to her anterior chest.  The pain is intermittent.  The pain is occasionally sharp, but in general is deep and aching.  The pain is worse in the morning.  The pain is worse when twisting her body.  She denies any burning.  She has not noticed any numbness or allodynia recently.  The patient continues to work with physical therapy with some benefit.  The patient takes intermittent naproxen with some benefit.  Otherwise, the patient would prefer to avoid any medications.  She denies any progressive weakness..  Pain history:  Of note patient broke her ribs in 1994 while pregnant.  She also had a breast reduction at the end of 1994 but no pain at that time.  Patient had shingles in 2001. Pain rating: intensity  Averages 5/10 on a 0-10 scale.      Current treatments include:  n as needed naproxen    Previous medication treatments included:  Capsaicin, lidocaine    Other treatments have  included:  Sulema Murphy has not been seen at a pain clinic in the past.  n/a  PT: Some help    Injections: Thoracic epidural    Past Medical History:  Past Medical History:   Diagnosis Date     Abdominal pain, unspecified site      Condyloma acuminatum     on colpo biopsies     Gastric ulcer      Gastro-oesophageal reflux disease      Hiatal hernia      History of blood transfusion      Hypothyroidism     Follows with Endocrinology     Kidney stones      Lupus     Dermal lupus     Nonspecific elevation of levels of transaminase or lactic acid dehydrogenase (LDH)      Osteopenia      Pap smear of cervix not needed      PONV (postoperative nausea and vomiting)      Pyelonephritis      Rosacea      Rosacea      Sjoegren syndrome (H)      Sjogren's disease (H)     follows with Rheum     Thyroid disease      Past Surgical History:  Past Surgical History:   Procedure Laterality Date     BIOPSY VAGINAL  03    BENIGN POLYPOID GRANULATION TISSUE      SECTION       COLONOSCOPY       COLPOSCOPY, BIOPSY, COMBINED      for ascus x 3; biopsies showed flat condyloma     HC INSERTION INTRAUTERINE DEVICE  1994    copper 7     HC REDUCTION OF LARGE BREAST       HC REMOVE INTRAUTERINE DEVICE  98     HC UGI ENDOSCOPY W EUS  2013    Procedure: COMBINED ENDOSCOPIC ULTRASOUND, ESOPHAGOSCOPY, GASTROSCOPY, DUODENOSCOPY (EGD);  Surgeon: Grace Heck MD;  Location:  GI     HYSTERECTOMY  2002    abdominal, with left oopherectomy for adhesions     LAPAROSCOPIC CHOLECYSTECTOMY  2013    Procedure: LAPAROSCOPIC CHOLECYSTECTOMY;  LAPAROSCOPIC CHOLECYSTECTOMY ;  Surgeon: Juan Carlos Bates MD;  Location:  [facility]     LAPAROTOMY EXPLORATORY      several; for adhesions     LASIK CUSTOMVUE BILATERAL  2014    Procedure: LASIK CUSTOMVUE BILATERAL;  BILATERAL CUSTOMVUE LASIK WITH INTRALASE;  Surgeon: Christi Kessler MD;  Location:  [facility]     left salpingoophorectomy        SHOULDER SURGERY  2008     [facility]  2002     WAVESCAN SCREENING  4/1/2014    Procedure: WAVESCAN SCREENING;  BILATERAL WAVESCAN;  Surgeon: Christi Kessler MD;  [facility]: [facility]     Medications:  Current Outpatient Prescriptions   Medication Sig Dispense Refill     cycloSPORINE (RESTASIS) 0.05 % ophthalmic emulsion 1 drop every 12 hours.       GLUCOSAMINE-CHONDROITIN PO Take  by mouth.       Levothyroxine Sodium (SYNTHROID PO) Take 125 mcg by mouth daily       meloxicam (MOBIC) 7.5 MG tablet Take 1 tablet (7.5 mg) by mouth daily as needed for moderate pain or pain 90 tablet 1     metroNIDAZOLE (METROLOTION) 0.75 % LOTN Apply thin layer to the affected area by topical route 2 times per day in the morning and evening       Multiple Vitamin (MULTI-VITAMINS PO) Take  by mouth.       CALCIUM PO Take  by mouth.       SYNTHROID 137 MCG OR TABS ONE TABLET DAILY IN THE MORNING 3 MONTHS 1 YEAR     Allergies:     Allergies   Allergen Reactions     Codeine      Betadine Plus Rash     Penicillins Rash     Sulfa Drugs Rash     Tetracycline Rash     Social History:     Occupation/Schooling: rn  Tobacco use: no  Alcohol use: no  Drug use: no  History of chemical dependency treatment: no    Family history:  Family History   Problem Relation Age of Onset     Coronary Artery Disease Father      Coronary Artery Disease Maternal Grandfather      Cancer       other     Rheumatoid Arthritis Maternal Grandmother      Rheumatoid Arthritis Paternal Grandmother      Breast Cancer Maternal Aunt      Breast Cancer Paternal Aunt      Diabetes Paternal Grandfather      Diabetes Maternal Grandfather      Fractures       hip     Hyperlipidemia Father      Hyperlipidemia Sister      Hyperlipidemia Brother      Hyperlipidemia Maternal Grandmother      Hyperlipidemia Maternal Grandfather      Hyperlipidemia Paternal Grandmother      Hyperlipidemia Paternal Grandfather      Hypertension Father      Hypertension Maternal Grandfather      Hypertension  Paternal Grandfather      Coronary Artery Disease Paternal Grandfather      Osteoporosis Mother      Osteoporosis       Thyroid Disease Mother      Thyroid Disease Sister      Thyroid Disease Maternal Grandmother      Thyroid Disease Maternal Grandfather      Thyroid Disease Paternal Grandmother      Thyroid Disease Paternal Grandfather      Thyroid Disease       Family history of headaches: no    Review of Systems:  Skin: negative  Eyes: negative  Ears/Nose/Throat: negative  Respiratory: No shortness of breath, dyspnea on exertion, cough, or hemoptysis  Cardiovascular: negative  Gastrointestinal: negative  Genitourinary: negative  Musculoskeletal: negative  Neurologic: negative  Psychiatric: negative  Hematologic/Lymphatic/Immunologic: negative  Endocrine: negative    Physical Exam:  Vitals:    10/01/18 1309   BP: 130/89   BP Location: Right arm   Patient Position: Sitting   Cuff Size: Adult Regular   Pulse: 76   SpO2: 99%   Weight: 120 lb 14.4 oz (54.8 kg)     Exam:  Constitutional: healthy, alert and no distress  Head: normocephalic. Atraumatic.   Eyes: no redness or jaundice noted   ENT: oropharnx normal.  MMM.  Neck supple.    Cardiovascular: Negative JVD  Respiratory: Speaking in full sentences no accessory muscles use   gastrointestinal: soft, non-tender,   Skin: no suspicious lesions or rashes  Psychiatric: mentation appears normal and affect normal/bright    Musculoskeletal exam:  Gait/Station/Posture: wnl  Cervical spine: ROMwnl       Thoracic spine:  tenderness to deep palpation positive approximately T10-T12      Neurologic exam:  CN:  Cranial nerves 2-12 are normal  Motor:  5/5 UE  Reflexes:     Biceps:     +2    Sensory:  (upper and lower extremities):   Light touch: Decreased   Allodynia: absent    t     Diagnostic tests:  MRI cervical reviewed    Assessment/Plan:  Sulema Murphy is a 57 year old female who presents with the complaints of right thoracic pain radiating to her anterior  chest/abdomen.  Concern for thoracic radiculopathy versus postherpetic neuralgia versus myofascial muscle pain  Sulema was seen today for pain.    Diagnoses and all orders for this visit:    Myofascial muscle pain  -     meloxicam (MOBIC) 7.5 MG tablet; Take 1 tablet (7.5 mg) by mouth daily as needed for moderate pain or pain  -     PAIN INJECTION EVAL/TREAT/FOLLOW UP    Thoracic neuralgia  -     meloxicam (MOBIC) 7.5 MG tablet; Take 1 tablet (7.5 mg) by mouth daily as needed for moderate pain or pain  -     PAIN INJECTION EVAL/TREAT/FOLLOW UP    - Further procedures recommended:    - ordered trigger point injection will need ultrasound     - Consider intercostal nerve block  - Medication Management:    - Will start Mobic 7.5mg daily as needed. Do not take motrin/naproxen/NSAIDS while taking this medication    - Consider starting low dose muscle relaxer(zanaflex)- Patient will decide     - Reasonable to pursue MDT-will place order pending patient's decision to proceed.  - Follow up:    -1-2 months after procedure        Total time spent was 30 minutes, and more than 50% of face to face time was spent counseling and/or coordination of care regarding principles of multidisciplinary care and medication management right thoracic pain radiating to her anterior chest/abdomen.     Shashank Brown MD  [facility]           Again, thank you for allowing me to participate in the care of your patient.        Sincerely,        Shashank Brown MD

## 2018-10-01 NOTE — PROGRESS NOTES
[facility] follow up    Chief Complaint:    Chief Complaint   Patient presents with     Pain     recs  - Further procedures recommended:    - Consider trigger point injection    - Consider Thoracic epidural( reasonable to start with thoracic epidural to rule out radiculopathy vs post herpetic neuralgia)   - Consider intercostal nerve block  - Medication Management:    - Consider start low dose muscle relaxer(zanaflex)   - consider starting low dose medication for neuropathic pain(100mg of Gabapentin tid)  - Diagnostic Studies: ordered  thoracic MRI   - Follow up: Pending decision on how to proceed.    Interval:  Thoracic epidural 6/18.  The patient notes approximately 20% relief with her thoracic epidural.  She continues to have mid thoracic pain radiating to her anterior chest.  The pain is intermittent.  The pain is occasionally sharp, but in general is deep and aching.  The pain is worse in the morning.  The pain is worse when twisting her body.  She denies any burning.  She has not noticed any numbness or allodynia recently.  The patient continues to work with physical therapy with some benefit.  The patient takes intermittent naproxen with some benefit.  Otherwise, the patient would prefer to avoid any medications.  She denies any progressive weakness..  Pain history:  Of note patient broke her ribs in 1994 while pregnant.  She also had a breast reduction at the end of 1994 but no pain at that time.  Patient had shingles in 2001. Pain rating: intensity  Averages 5/10 on a 0-10 scale.      Current treatments include:  n as needed naproxen    Previous medication treatments included:  Capsaicin, lidocaine    Other treatments have included:  Sulema Yuki Murphy has not been seen at a pain clinic in the past.  n/a  PT: Some help    Injections: Thoracic epidural    Past Medical History:  Past Medical History:   Diagnosis Date     Abdominal pain, unspecified site      Condyloma acuminatum      on colpo biopsies     Gastric ulcer      Gastro-oesophageal reflux disease      Hiatal hernia      History of blood transfusion      Hypothyroidism     Follows with Endocrinology     Kidney stones      Lupus     Dermal lupus     Nonspecific elevation of levels of transaminase or lactic acid dehydrogenase (LDH)      Osteopenia      Pap smear of cervix not needed      PONV (postoperative nausea and vomiting)      Pyelonephritis      Rosacea      Rosacea      Sjoegren syndrome (H)      Sjogren's disease (H)     follows with Rheum     Thyroid disease      Past Surgical History:  Past Surgical History:   Procedure Laterality Date     BIOPSY VAGINAL  03    BENIGN POLYPOID GRANULATION TISSUE      SECTION       COLONOSCOPY       COLPOSCOPY, BIOPSY, COMBINED      for ascus x 3; biopsies showed flat condyloma     HC INSERTION INTRAUTERINE DEVICE  1994    copper 7     HC REDUCTION OF LARGE BREAST       HC REMOVE INTRAUTERINE DEVICE  98     HC UGI ENDOSCOPY W EUS  2013    Procedure: COMBINED ENDOSCOPIC ULTRASOUND, ESOPHAGOSCOPY, GASTROSCOPY, DUODENOSCOPY (EGD);  Surgeon: Grace Heck MD;  Location:  GI     HYSTERECTOMY      abdominal, with left oopherectomy for adhesions     LAPAROSCOPIC CHOLECYSTECTOMY  2013    Procedure: LAPAROSCOPIC CHOLECYSTECTOMY;  LAPAROSCOPIC CHOLECYSTECTOMY ;  Surgeon: Juan Carlos Bates MD;  Location:  [facility]     LAPAROTOMY EXPLORATORY      several; for adhesions     LASIK CUSTOMVUE BILATERAL  2014    Procedure: LASIK CUSTOMVUE BILATERAL;  BILATERAL CUSTOMVUE LASIK WITH INTRALASE;  Surgeon: Christi Kessler MD;  Location: [facility]     left salpingoophorectomy       SHOULDER SURGERY       YOSELIN       WAVESCAN SCREENING  2014    Procedure: WAVESCAN SCREENING;  BILATERAL WAVESCAN;  Surgeon: Christi Kessler MD;  Location: [facility]     Medications:  Current Outpatient Prescriptions   Medication Sig Dispense Refill      cycloSPORINE (RESTASIS) 0.05 % ophthalmic emulsion 1 drop every 12 hours.       GLUCOSAMINE-CHONDROITIN PO Take  by mouth.       Levothyroxine Sodium (SYNTHROID PO) Take 125 mcg by mouth daily       meloxicam (MOBIC) 7.5 MG tablet Take 1 tablet (7.5 mg) by mouth daily as needed for moderate pain or pain 90 tablet 1     metroNIDAZOLE (METROLOTION) 0.75 % LOTN Apply thin layer to the affected area by topical route 2 times per day in the morning and evening       Multiple Vitamin (MULTI-VITAMINS PO) Take  by mouth.       CALCIUM PO Take  by mouth.       SYNTHROID 137 MCG OR TABS ONE TABLET DAILY IN THE MORNING 3 MONTHS 1 YEAR     Allergies:     Allergies   Allergen Reactions     Codeine      Betadine Plus Rash     Penicillins Rash     Sulfa Drugs Rash     Tetracycline Rash     Social History:     Occupation/Schooling: rn  Tobacco use: no  Alcohol use: no  Drug use: no  History of chemical dependency treatment: no    Family history:  Family History   Problem Relation Age of Onset     Coronary Artery Disease Father      Coronary Artery Disease Maternal Grandfather      Cancer       other     Rheumatoid Arthritis Maternal Grandmother      Rheumatoid Arthritis Paternal Grandmother      Breast Cancer Maternal Aunt      Breast Cancer Paternal Aunt      Diabetes Paternal Grandfather      Diabetes Maternal Grandfather      Fractures       hip     Hyperlipidemia Father      Hyperlipidemia Sister      Hyperlipidemia Brother      Hyperlipidemia Maternal Grandmother      Hyperlipidemia Maternal Grandfather      Hyperlipidemia Paternal Grandmother      Hyperlipidemia Paternal Grandfather      Hypertension Father      Hypertension Maternal Grandfather      Hypertension Paternal Grandfather      Coronary Artery Disease Paternal Grandfather      Osteoporosis Mother      Osteoporosis       Thyroid Disease Mother      Thyroid Disease Sister      Thyroid Disease Maternal Grandmother      Thyroid Disease Maternal Grandfather      Thyroid  Disease Paternal Grandmother      Thyroid Disease Paternal Grandfather      Thyroid Disease       Family history of headaches: no    Review of Systems:  Skin: negative  Eyes: negative  Ears/Nose/Throat: negative  Respiratory: No shortness of breath, dyspnea on exertion, cough, or hemoptysis  Cardiovascular: negative  Gastrointestinal: negative  Genitourinary: negative  Musculoskeletal: negative  Neurologic: negative  Psychiatric: negative  Hematologic/Lymphatic/Immunologic: negative  Endocrine: negative    Physical Exam:  Vitals:    10/01/18 1309   BP: 130/89   BP Location: Right arm   Patient Position: Sitting   Cuff Size: Adult Regular   Pulse: 76   SpO2: 99%   Weight: 120 lb 14.4 oz (54.8 kg)     Exam:  Constitutional: healthy, alert and no distress  Head: normocephalic. Atraumatic.   Eyes: no redness or jaundice noted   ENT: oropharnx normal.  MMM.  Neck supple.    Cardiovascular: Negative JVD  Respiratory: Speaking in full sentences no accessory muscles use   gastrointestinal: soft, non-tender,   Skin: no suspicious lesions or rashes  Psychiatric: mentation appears normal and affect normal/bright    Musculoskeletal exam:  Gait/Station/Posture: wnl  Cervical spine: ROMwnl       Thoracic spine:  tenderness to deep palpation positive approximately T10-T12      Neurologic exam:  CN:  Cranial nerves 2-12 are normal  Motor:  5/5 UE  Reflexes:     Biceps:     +2    Sensory:  (upper and lower extremities):   Light touch: Decreased   Allodynia: absent    t     Diagnostic tests:  MRI cervical reviewed    Assessment/Plan:  Sulema Murphy is a 57 year old female who presents with the complaints of right thoracic pain radiating to her anterior chest/abdomen.  Concern for thoracic radiculopathy versus postherpetic neuralgia versus myofascial muscle pain  Sulema was seen today for pain.    Diagnoses and all orders for this visit:    Myofascial muscle pain  -     meloxicam (MOBIC) 7.5 MG tablet; Take 1 tablet (7.5  mg) by mouth daily as needed for moderate pain or pain  -     PAIN INJECTION EVAL/TREAT/FOLLOW UP    Thoracic neuralgia  -     meloxicam (MOBIC) 7.5 MG tablet; Take 1 tablet (7.5 mg) by mouth daily as needed for moderate pain or pain  -     PAIN INJECTION EVAL/TREAT/FOLLOW UP    - Further procedures recommended:    - ordered trigger point injection will need ultrasound     - Consider intercostal nerve block  - Medication Management:    - Will start Mobic 7.5mg daily as needed. Do not take motrin/naproxen/NSAIDS while taking this medication    - Consider starting low dose muscle relaxer(zanaflex)- Patient will decide     - Reasonable to pursue MDT-will place order pending patient's decision to proceed.  - Follow up:    -1-2 months after procedure        Total time spent was 30 minutes, and more than 50% of face to face time was spent counseling and/or coordination of care regarding principles of multidisciplinary care and medication management right thoracic pain radiating to her anterior chest/abdomen.     Shashank Brown MD  [facility]

## 2018-10-01 NOTE — MR AVS SNAPSHOT
After Visit Summary   10/1/2018    Sulema Murphy    MRN: [id number]           Patient Information     Date Of Birth          1960        Visit Information        Provider Department      10/1/2018 1:00 PM Shashank rBown MD [facility]        Today's Diagnoses     Myofascial muscle pain    -  1    Thoracic neuralgia          Care Instructions    - Further procedures recommended:    - ordered trigger point injection will need ultrasound     - Consider intercostal nerve block  - Medication Management:    - Will start Mobic 7.5mg daily as needed. Do not take motrin/naproxen/NSAIDS while taking this medication    - Consider start low dose muscle relaxer(zanaflex)- Patient will decide     - Reasonable to pursue MDT- patient will call with information    - Follow up:    -1-2 months after procedure      ----------------------------------------------------------------  Clinic Number:  [phone]   Call this number with any questions about your care and for scheduling assistance. Calls are returned Monday through Friday between 8 AM and 4:30 PM. We usually get back to you within 2 business days depending on the issue/request.       Medication refills:    For non-narcotic medications, call your pharmacy directly to request a refill. The pharmacy will contact the [facility] for authorization. Please allow 3-4 days for these refills to be processed.     For narcotic refills, call the clinic number or send a Simple Crossing message. Please contact us 7-10 days before your refill is due. The message MUST include the name of the specific medication(s) requested and how you would like to receive the prescription(s). The options are as follows:    Pain Clinic staff can mail the prescription to your pharmacy. Please tell us the name of the pharmacy.    You may pick the prescription up at the Pain Clinic (tell us the location) or during a clinic visit with your pain  provider    Pain Clinic staff can deliver the prescription to the [place] pharmacy in the clinic building. Please tell us the location.      We believe regular attendance is key to your success in our program.    Any time you are unable to keep your appointment we ask that you call us at least 24 hours in advance to let us know. This will allow us to offer the appointment time to another patient.               Follow-ups after your visit        Additional Services     PAIN INJECTION EVAL/TREAT/FOLLOW UP                 Who to contact     If you have questions or need follow up information about today's clinic visit or your schedule please contact [facility] directly at [phone].  Normal or non-critical lab and imaging results will be communicated to you by MyChart, letter or phone within 4 business days after the clinic has received the results. If you do not hear from us within 7 days, please contact the clinic through TipHivehart or phone. If you have a critical or abnormal lab result, we will notify you by phone as soon as possible.  Submit refill requests through Koofers or call your pharmacy and they will forward the refill request to us. Please allow 3 business days for your refill to be completed.          Additional Information About Your Visit        TipHivehart Information     [employer]ers gives you secure access to your electronic health record. If you see a primary care provider, you can also send messages to your care team and make appointments. If you have questions, please call your primary care clinic.  If you do not have a primary care provider, please call [phone] and they will assist you.        Care EveryWhere ID     This is your Care EveryWhere ID. This could be used by other organizations to access your [place] medical records  [id number]        Your Vitals Were     Pulse Pulse Oximetry BMI (Body Mass Index)             76 99% 20.59 kg/m2          Blood Pressure from Last 3  Encounters:   10/01/18 130/89   06/18/18 122/89   05/21/18 130/86    Weight from Last 3 Encounters:   10/01/18 120 lb 14.4 oz (54.8 kg)   05/21/18 120 lb (54.4 kg)   02/22/16 118 lb (53.5 kg)              We Performed the Following     PAIN INJECTION EVAL/TREAT/FOLLOW UP          Today's Medication Changes          These changes are accurate as of 10/1/18  1:50 PM.  If you have any questions, ask your nurse or doctor.               Start taking these medicines.        Dose/Directions    meloxicam 7.5 MG tablet   Commonly known as:  MOBIC   Used for:  Myofascial muscle pain, Thoracic neuralgia   Started by:  Shashank Brown MD        Dose:  7.5 mg   Take 1 tablet (7.5 mg) by mouth daily as needed for moderate pain or pain   Quantity:  90 tablet   Refills:  1            Where to get your medicines      These medications were sent to [facility] [place] - [address]  [address], [facility] [address]     Phone:  [phone]     meloxicam 7.5 MG tablet                Primary Care Provider Office Phone # Fax #    [facility] [phone]       [address] [place] [address], [address]  [facility] [address]        Equal Access to Services     JOHANN BROUSSARD : Hadii ina ku hadasho Soomaali, waaxda luqadaha, qaybta kaalmada adeegyada, mague rene. So [place] [phone].    ATENCIÓN: Si habla español, tiene a julien disposición servicios gratuitos de asistencia lingüística. Iain al [phone].    We comply with applicable federal civil rights laws and [place] laws. We do not discriminate on the basis of race, color, national origin, age, disability, sex, sexual orientation, or gender identity.            Thank you!     Thank you for choosing [facility]  for your care. Our goal is always to provide you with excellent care. Hearing back from our patients is one way we can continue to improve our services. Please  take a few minutes to complete the written survey that you may receive in the mail after your visit with us. Thank you!             Your Updated Medication List - Protect others around you: Learn how to safely use, store and throw away your medicines at www.disposemymeds.org.          This list is accurate as of 10/1/18  1:50 PM.  Always use your most recent med list.                   Brand Name Dispense Instructions for use Diagnosis    CALCIUM PO      Take  by mouth.        cycloSPORINE 0.05 % ophthalmic emulsion    RESTASIS     1 drop every 12 hours.        GLUCOSAMINE-CHONDROITIN PO      Take  by mouth.        meloxicam 7.5 MG tablet    MOBIC    90 tablet    Take 1 tablet (7.5 mg) by mouth daily as needed for moderate pain or pain    Myofascial muscle pain, Thoracic neuralgia       METROLOTION 0.75 % Lotn   Generic drug:  metroNIDAZOLE      Apply thin layer to the affected area by topical route 2 times per day in the morning and evening        MULTI-VITAMINS PO      Take  by mouth.        * SYNTHROID PO      Take 125 mcg by mouth daily        * SYNTHROID 137 MCG tablet   Generic drug:  levothyroxine     3 MONTHS    ONE TABLET DAILY IN THE MORNING    Pain in limb, Plantar fascial fibromatosis       * Notice:  This list has 2 medication(s) that are the same as other medications prescribed for you. Read the directions carefully, and ask your doctor or other care provider to review them with you.

## 2018-10-01 NOTE — TELEPHONE ENCOUNTER
Left voicemail for patient to schedule Trigger Point Injections.    Please view order before scheduling.     Brenda AGUIAR    [facility] [place]

## 2018-10-08 ENCOUNTER — OFFICE VISIT (OUTPATIENT)
Dept: PALLIATIVE MEDICINE | Facility: CLINIC | Age: 58
End: 2018-10-08
Attending: PAIN MEDICINE
Payer: COMMERCIAL

## 2018-10-08 ENCOUNTER — MYC MEDICAL ADVICE (OUTPATIENT)
Dept: PALLIATIVE MEDICINE | Facility: CLINIC | Age: 58
End: 2018-10-08

## 2018-10-08 VITALS — BODY MASS INDEX: 20.44 KG/M2 | WEIGHT: 120 LBS

## 2018-10-08 DIAGNOSIS — M79.18 MYOFASCIAL PAIN: Primary | ICD-10-CM

## 2018-10-08 DIAGNOSIS — M79.2 THORACIC NEURALGIA: ICD-10-CM

## 2018-10-08 DIAGNOSIS — M79.18 MYOFASCIAL MUSCLE PAIN: Primary | ICD-10-CM

## 2018-10-08 PROCEDURE — 20553 NJX 1/MLT TRIGGER POINTS 3/>: CPT | Performed by: PHYSICAL MEDICINE & REHABILITATION

## 2018-10-08 ASSESSMENT — PAIN SCALES - GENERAL: PAINLEVEL: SEVERE PAIN (6)

## 2018-10-08 NOTE — MR AVS SNAPSHOT
After Visit Summary   10/8/2018    Sulema Murphy    MRN: [id number]           Patient Information     Date Of Birth          1960        Visit Information        Provider Department      10/8/2018 10:30 AM Robert Varner MD [facility]        Care Instructions    [facility]   Post Procedure Instructions    Today you had:  trigger point injections     Medications used:  lidocaine   bupivicaine           Go to the emergency room if you develop any shortness of breath    Monitor the injection sites for signs and symptoms of infection-fever, chills, redness, swelling, warmth, or drainage to areas.    You may have soreness at injection sites for up to 24 hours.    You may apply ice to the painful areas to help minimize the discomfort of the needle pokes.    Do not apply heat to sites for at least 12 hours.    You may use anti-inflammatory medications or Tylenol for pain control if necessary    Pain Clinic phone number during work hours Monday-Friday:  [phone]    After hours provider line: [phone]                  Follow-ups after your visit        Who to contact     If you have questions or need follow up information about today's clinic visit or your schedule please contact [facility] directly at [phone].  Normal or non-critical lab and imaging results will be communicated to you by MyChart, letter or phone within 4 business days after the clinic has received the results. If you do not hear from us within 7 days, please contact the clinic through MyChart or phone. If you have a critical or abnormal lab result, we will notify you by phone as soon as possible.  Submit refill requests through [employer]dy or call your pharmacy and they will forward the refill request to us. Please allow 3 business days for your refill to be completed.          Additional Information About Your Visit        MyChart Information      [employer] gives you secure access to your electronic health record. If you see a primary care provider, you can also send messages to your care team and make appointments. If you have questions, please call your primary care clinic.  If you do not have a primary care provider, please call [phone] and they will assist you.        Care EveryWhere ID     This is your Care EveryWhere ID. This could be used by other organizations to access your [place] medical records  [id number]        Your Vitals Were     BMI (Body Mass Index)                   20.44 kg/m2            Blood Pressure from Last 3 Encounters:   10/01/18 130/89   06/18/18 122/89   05/21/18 130/86    Weight from Last 3 Encounters:   10/08/18 54.4 kg (120 lb)   10/01/18 54.8 kg (120 lb 14.4 oz)   05/21/18 54.4 kg (120 lb)              Today, you had the following     No orders found for display       Primary Care Provider Office Phone # Fax #    [facility] [phone]       [address] [place] [address], [address]  [facility] [address]        Equal Access to Services     JOSEPHINE [facility] : Hadii ina saldana hadasho Sosina, waaxda luqadaha, qaybta kaalmada adebhavana, mague martin . So [facility] [phone].    ATENCIÓN: Si habla español, tiene a julien disposición servicios gratuitos de asistencia lingüística. [facility] [phone].    We comply with applicable federal civil rights laws and [place] laws. We do not discriminate on the basis of race, color, national origin, age, disability, sex, sexual orientation, or gender identity.            Thank you!     Thank you for choosing [facility]  for your care. Our goal is always to provide you with excellent care. Hearing back from our patients is one way we can continue to improve our services. Please take a few minutes to complete the written survey that you may receive in the mail after your visit with us. Thank you!             Your Updated  Medication List - Protect others around you: Learn how to safely use, store and throw away your medicines at www.disposemymeds.org.          This list is accurate as of 10/8/18 10:50 AM.  Always use your most recent med list.                   Brand Name Dispense Instructions for use Diagnosis    CALCIUM PO      Take  by mouth.        cycloSPORINE 0.05 % ophthalmic emulsion    RESTASIS     1 drop every 12 hours.        GLUCOSAMINE-CHONDROITIN PO      Take  by mouth.        meloxicam 7.5 MG tablet    MOBIC    90 tablet    Take 1 tablet (7.5 mg) by mouth daily as needed for moderate pain or pain    Myofascial muscle pain, Thoracic neuralgia       METROLOTION 0.75 % Lotn   Generic drug:  metroNIDAZOLE      Apply thin layer to the affected area by topical route 2 times per day in the morning and evening        MULTI-VITAMINS PO      Take  by mouth.        SYNTHROID PO      Take 125 mcg by mouth daily

## 2018-10-08 NOTE — PROGRESS NOTES
[facility] - Procedure Note    Date of Visit: 10/8/2018    Pre procedure Diagnosis: myofascial pain/myositis 60.9   Post procedure Diagnosis: Same  Procedure performed: trigger point injections  Anesthesia: none  Complications: none  Operators: Robert Varner DO    Indications:   Sulema Murphy is a 58 year old female with a history of right sided thoracic myofascial pain.  Exam shows myofascial pain of the right trapezius, rhomboid, lower trapezius, serratus anterior and they have tried conservative treatment including oral medications and physical therapy.    Options/alternatives, benefits and risks were discussed with the patient including bleeding, infection, tissue trauma and pnuemothorax.  Questions were answered to her satisfaction and she agrees to proceed. Voluntary informed consent was obtained and signed.     Vitals were reviewed: Yes  Allergies were reviewed:  Yes   Medications were reviewed:  Yes   Pre-procedure pain score: 6/10    Procedure:  After getting informed consent, a Pause for the Cause was performed.    Trigger points were identified by patient, and marked when appropriate.  The area was prepped with Chloroprep.    Using clean technique, injections were completed using a 25G, 1 inch needle.  After negative aspiration, injection was completed.  A total of 4 locations were injected.  When possible, tissue was retracted from the chest wall to avoid lung injury.    Muscle groups injected:  1, Right trapezius  2. Right rhomboid major  3. Right levator scapulae  4. Right serratus anterior      Injection solution contained:  5ml of 1% lidocaine and 5ml of 0.5% bupivacaine.    Hemostasis was achieved, the area was cleaned, and bandaids were placed when appropriate.  The patient tolerated the procedure well.  Breath sounds were normal.        Pre-procedure pain score: 6/10  Post-procedure pain score: 4/10     Assessment/Plan: Sulema Murphy is a 58 year old  female s/p right sided trigger point injections for chronic myofascial pain    1. Following today's procedure, the patient was advised to contact the [facility] for any of the following:   Fever, chills, or night sweats   New onset of pain, numbness, or weakness   Any questions/concerns regarding the procedure  If unable to contact the Pain Center, the patient was instructed to go to a local Emergency Room for any complications.   2. The patient will receive a follow-up call in 1 week.  3. The patient should follow-up with the referring provider in 2 weeks for post-procedure evaluation.        Robert Varner DO  [facility]

## 2018-10-08 NOTE — PATIENT INSTRUCTIONS
[facility]   Post Procedure Instructions    Today you had:  trigger point injections     Medications used:  lidocaine   bupivicaine           Go to the emergency room if you develop any shortness of breath    Monitor the injection sites for signs and symptoms of infection-fever, chills, redness, swelling, warmth, or drainage to areas.    You may have soreness at injection sites for up to 24 hours.    You may apply ice to the painful areas to help minimize the discomfort of the needle pokes.    Do not apply heat to sites for at least 12 hours.    You may use anti-inflammatory medications or Tylenol for pain control if necessary    Pain Clinic phone number during work hours Monday-Friday:  [phone]    After hours provider line: [phone]

## 2018-10-08 NOTE — TELEPHONE ENCOUNTER
From: Sulema Murphy      Created: 10/8/2018 2:18 PM        *-*-*This message has not been handled.*-*-*    Jacqueline Brown.  I had the TPI today at the [facility] office with Dr. Varner, thanks for organizing that treatment!  Hopefully it will help.  Last appointment with you,  I inquired about MDT certified spine therapists (Chiropractor Estefani suggested it).  I contacted [facility] ([facility]), they have a few therapists (scheduled under 'specialty' scheduling) that are experienced in Lumbar and thorasic manipulation, mechanical diagnostic therapy ([facility] therapy); those therapists are Rich Lainez and Samantha Trujillo at the [place] location and Jackson Jean at the  location.   Do you think this would be an additional approach for my treatment plan for my pain?  If so, can you enter a referral please (the [facility] usually contacts the patient after a referral is entered).  thanks Dr. Brown.        Valentin Enriquez, RN  Care Coordinator   [facility]

## 2018-10-09 NOTE — TELEPHONE ENCOUNTER
Message sent to patient:    Dr. Kevin Ying has placed the order for the treatment you requested. Someone should be calling you to schedule but you may certainly contact [facility] at your convenience at [phone].  Hopefully this will provide some benefit to you.     Shavon Barnett, SAMMYN, RN-BC  Patient Care Supervisor/Care Coordinator  [facility]

## 2018-10-15 ENCOUNTER — THERAPY VISIT (OUTPATIENT)
Dept: PHYSICAL THERAPY | Facility: CLINIC | Age: 58
End: 2018-10-15
Payer: COMMERCIAL

## 2018-10-15 DIAGNOSIS — M25.511 RIGHT SHOULDER PAIN: ICD-10-CM

## 2018-10-15 DIAGNOSIS — M54.6 RIGHT-SIDED THORACIC BACK PAIN: ICD-10-CM

## 2018-10-15 DIAGNOSIS — M54.2 NECK PAIN: Primary | ICD-10-CM

## 2018-10-15 PROCEDURE — 97110 THERAPEUTIC EXERCISES: CPT | Mod: GP | Performed by: PHYSICAL THERAPIST

## 2018-10-15 PROCEDURE — 97161 PT EVAL LOW COMPLEX 20 MIN: CPT | Mod: GP | Performed by: PHYSICAL THERAPIST

## 2018-10-15 NOTE — PROGRESS NOTES
[facility] Initial Evaluation  Subjective:  Patient is a 58 year old female presenting with rehab cervical spine hpi. The history is provided by the patient.   Sulema Murphy is a 58 year old female with a cervical spine and thoracic spine (right shoulder) condition.      This is a chronic condition  Patient complains of long standing right neck and shoulder pain.  She feels neck pain is related to injury (knocked out while playing ultimate frisbee about 35 years ago). She had right shoulder Brant procedure in 2010.  She complains of right > left neck pain with radiation to head and down right UE with T/N lateral 3 fingers.  She complains of achey pain at right shoulder. Her chief complaint is right scapular pain which began about a year ago without precipitating event. This pain initially radiated to anterior ribs.  Patient has been treated in past by PT, chiropractor and is under the care of a pain clinic.  She had recent trigger point injections without result.  Symptoms are worse with pulling (eg doors or vacuum , sitting and with walking..        Pain is described as aching and sharp and is constant and reported as 6/10.     Symptoms are exacerbated by nothing   Since onset symptoms are unchanged.  Special tests:  MRI (no thoracic disc herniation, degeneration at facet joints with small joint effusions at T9-T10).                                                    Objective:  Standing Alignment:    Cervical/Thoracic:  Thoracic kyphosis decreased and cervical lordosis decreased  Shoulder/UE:  Depressed scapula R, elevated scapula L, scapular winging L and scapular winging R                                  Cervical/Thoracic Evaluation    AROM:  AROM Cervical:    Flexion:            No loss without complaint  Extension:       Generous motion without complaint  Rotation:         Left: no loss without complaint     Right: no loss without complaint  Side Bend:      Left: no  loss without complaint     Right:  Min loss without complaint  AROM Thoracic:    Flexion:               No complaint  Extension:          Right scapular complaint  Rotation:            Left: right scapular pain with return     Right: right scapular complaint with return      Headaches: cervical                         Shoulder Evaluation:  ROM:  AROM:  normal                                  Strength:    Flexion: Right: 5/5      Pain:  -  Extension:  Right: 5/5     Pain:-  Abduction:  Right: 4+/5      Pain:-  Adduction:  Right: 5/5      Pain:-  Internal Rotation:  Right: 5/5      Pain:-  External Rotation:   Right:4+/5      Pain:++      Horizontal Adduction:  Right:/5     Pain:-        Special Tests:      Right shoulder positive for the following special tests:Neural Tension  Palpation:      Right shoulder tenderness present at: Levator and Upper Trap                                 lats    General     ROS    Assessment/Plan:    Patient is a 58 year old female with cervical, thoracic and right side shoulder complaints.    Patient has the following significant findings with corresponding treatment plan.                Diagnosis 1:  Longstanding right neck and shoulder pain with newer right thoracic region pain  Pain -  manual therapy, self management, education and home program  Decreased strength - therapeutic exercise and therapeutic activities  Decreased function - therapeutic activities    Therapy Evaluation Codes:   1) History comprised of:   Personal factors that impact the plan of care:      None.    Comorbidity factors that impact the plan of care are:      None.     Medications impacting care: None.  2) Examination of Body Systems comprised of:   Body structures and functions that impact the plan of care:      Cervical spine, Shoulder and Thoracic Spine.   Activity limitations that impact the plan of care are:      Cooking, Dressing and Lifting.  3) Clinical presentation characteristics  are:   Stable/Uncomplicated.  4) Decision-Making    Low complexity using standardized patient assessment instrument and/or measureable assessment of functional outcome.  Cumulative Therapy Evaluation is: Low complexity.    Previous and current functional limitations:  (See Goal Flow Sheet for this information)    Short term and Long term goals: (See Goal Flow Sheet for this information)     Communication ability:  Patient appears to be able to clearly communicate and understand verbal and written communication and follow directions correctly.  Treatment Explanation - The following has been discussed with the patient:   RX ordered/plan of care  Anticipated outcomes  Possible risks and side effects  This patient would benefit from PT intervention to resume normal activities.   Rehab potential is good.    Frequency:  1   X week, once daily  Duration:  for 6-10 visits  Discharge Plan:  Achieve all LTG.  Independent in home treatment program.  Reach maximal therapeutic benefit.    Please refer to the daily flowsheet for treatment today, total treatment time and time spent performing 1:1 timed codes.

## 2018-10-16 NOTE — PROGRESS NOTES
[facility] Initial Evaluation  Subjective:  Patient is a 58 year old female presenting with rehab general hpi.   General   Please check all that apply to your current or past medical history:  Menopausal, migraines/headaches and thyroid problems (Pain at night/rest, numbness/tingling)  Medical allergies:  Yes (PCN/sulpha/tetrscycling)  Other surgeries:  Other (Bilat mammoplasty, reduction hero, , hysterectomy)  Medications you are currently taking:  Thyroid medication and anti-inflammatory  Occupation comment:  RN  What are your primary job tasks:  Prolonged standing (Computer work )                      Objective:  System    Physical Exam    General     ROS    Assessment/Plan:

## 2018-10-22 ENCOUNTER — THERAPY VISIT (OUTPATIENT)
Dept: PHYSICAL THERAPY | Facility: CLINIC | Age: 58
End: 2018-10-22
Payer: COMMERCIAL

## 2018-10-22 DIAGNOSIS — M54.6 RIGHT-SIDED THORACIC BACK PAIN: ICD-10-CM

## 2018-10-22 DIAGNOSIS — M25.511 CHRONIC RIGHT SHOULDER PAIN: ICD-10-CM

## 2018-10-22 DIAGNOSIS — G89.29 CHRONIC RIGHT SHOULDER PAIN: ICD-10-CM

## 2018-10-22 DIAGNOSIS — M54.2 NECK PAIN: ICD-10-CM

## 2018-10-22 PROCEDURE — 97110 THERAPEUTIC EXERCISES: CPT | Mod: GP | Performed by: PHYSICAL THERAPIST

## 2018-10-22 PROCEDURE — 97140 MANUAL THERAPY 1/> REGIONS: CPT | Mod: GP | Performed by: PHYSICAL THERAPIST

## 2018-10-22 PROCEDURE — 97112 NEUROMUSCULAR REEDUCATION: CPT | Mod: GP | Performed by: PHYSICAL THERAPIST

## 2018-10-29 ENCOUNTER — THERAPY VISIT (OUTPATIENT)
Dept: PHYSICAL THERAPY | Facility: CLINIC | Age: 58
End: 2018-10-29
Payer: COMMERCIAL

## 2018-10-29 DIAGNOSIS — M54.2 NECK PAIN: ICD-10-CM

## 2018-10-29 DIAGNOSIS — G89.29 CHRONIC RIGHT SHOULDER PAIN: ICD-10-CM

## 2018-10-29 DIAGNOSIS — M25.511 CHRONIC RIGHT SHOULDER PAIN: ICD-10-CM

## 2018-10-29 DIAGNOSIS — M54.6 RIGHT-SIDED THORACIC BACK PAIN: ICD-10-CM

## 2018-10-29 PROCEDURE — 97110 THERAPEUTIC EXERCISES: CPT | Mod: GP | Performed by: PHYSICAL THERAPIST

## 2018-10-29 PROCEDURE — 97112 NEUROMUSCULAR REEDUCATION: CPT | Mod: GP | Performed by: PHYSICAL THERAPIST

## 2018-10-29 PROCEDURE — 97140 MANUAL THERAPY 1/> REGIONS: CPT | Mod: GP | Performed by: PHYSICAL THERAPIST

## 2018-11-05 ENCOUNTER — MYC MEDICAL ADVICE (OUTPATIENT)
Dept: PALLIATIVE MEDICINE | Facility: CLINIC | Age: 58
End: 2018-11-05

## 2018-11-05 DIAGNOSIS — M79.18 MYOFASCIAL MUSCLE PAIN: Primary | ICD-10-CM

## 2018-11-05 NOTE — TELEPHONE ENCOUNTER
From: Sulema Murphy      Created: 11/5/2018 10:19 AM        *-*-*This message has not been handled.*-*-*    Jacqueline Brown, I am continuing with the PT thus far exercises being done; the therapy has not reduced any pain, but will aid to re strengthen my shoulder muscles.  I am still having daily / continued pain mid back/thoracic and wonder if additional trigger point or other deep treatment can alleviate/begin to reduce the pain.    Is the Ultrasound available in the [place] offices for TPI?  please advise, I would like to further identify the source for this pain and treat it without oral medications.  the Mobic is not as helpful as the naproxen.           Valentin Enriquez, RN  Care Coordinator   [facility]

## 2018-11-06 ENCOUNTER — THERAPY VISIT (OUTPATIENT)
Dept: PHYSICAL THERAPY | Facility: CLINIC | Age: 58
End: 2018-11-06
Payer: COMMERCIAL

## 2018-11-06 DIAGNOSIS — G89.29 CHRONIC RIGHT SHOULDER PAIN: ICD-10-CM

## 2018-11-06 DIAGNOSIS — M54.6 RIGHT-SIDED THORACIC BACK PAIN: ICD-10-CM

## 2018-11-06 DIAGNOSIS — M25.511 CHRONIC RIGHT SHOULDER PAIN: ICD-10-CM

## 2018-11-06 DIAGNOSIS — M54.2 NECK PAIN: ICD-10-CM

## 2018-11-06 PROCEDURE — 97140 MANUAL THERAPY 1/> REGIONS: CPT | Mod: GP | Performed by: PHYSICAL THERAPIST

## 2018-11-06 PROCEDURE — 97110 THERAPEUTIC EXERCISES: CPT | Mod: GP | Performed by: PHYSICAL THERAPIST

## 2018-11-06 PROCEDURE — 97112 NEUROMUSCULAR REEDUCATION: CPT | Mod: GP | Performed by: PHYSICAL THERAPIST

## 2018-11-06 NOTE — TELEPHONE ENCOUNTER
Pt is interested in Trigger point with US.      Please place order.    Valentin Enriquez, RN  Care Coordinator   [facility] [place]

## 2018-11-12 ENCOUNTER — THERAPY VISIT (OUTPATIENT)
Dept: PHYSICAL THERAPY | Facility: CLINIC | Age: 58
End: 2018-11-12
Payer: COMMERCIAL

## 2018-11-12 ENCOUNTER — OFFICE VISIT (OUTPATIENT)
Dept: PALLIATIVE MEDICINE | Facility: CLINIC | Age: 58
End: 2018-11-12
Payer: COMMERCIAL

## 2018-11-12 VITALS
DIASTOLIC BLOOD PRESSURE: 83 MMHG | BODY MASS INDEX: 20.27 KG/M2 | SYSTOLIC BLOOD PRESSURE: 121 MMHG | HEART RATE: 97 BPM | OXYGEN SATURATION: 100 % | WEIGHT: 119 LBS

## 2018-11-12 DIAGNOSIS — M25.511 CHRONIC RIGHT SHOULDER PAIN: ICD-10-CM

## 2018-11-12 DIAGNOSIS — M79.18 MYOFASCIAL MUSCLE PAIN: Primary | ICD-10-CM

## 2018-11-12 DIAGNOSIS — G89.29 CHRONIC RIGHT SHOULDER PAIN: ICD-10-CM

## 2018-11-12 DIAGNOSIS — M54.6 RIGHT-SIDED THORACIC BACK PAIN: ICD-10-CM

## 2018-11-12 DIAGNOSIS — M54.2 NECK PAIN: ICD-10-CM

## 2018-11-12 PROCEDURE — 20553 NJX 1/MLT TRIGGER POINTS 3/>: CPT | Performed by: PAIN MEDICINE

## 2018-11-12 PROCEDURE — 97140 MANUAL THERAPY 1/> REGIONS: CPT | Mod: GP | Performed by: PHYSICAL THERAPIST

## 2018-11-12 PROCEDURE — 97110 THERAPEUTIC EXERCISES: CPT | Mod: GP | Performed by: PHYSICAL THERAPIST

## 2018-11-12 PROCEDURE — 97112 NEUROMUSCULAR REEDUCATION: CPT | Mod: GP | Performed by: PHYSICAL THERAPIST

## 2018-11-12 ASSESSMENT — PAIN SCALES - GENERAL: PAINLEVEL: SEVERE PAIN (6)

## 2018-11-12 NOTE — MR AVS SNAPSHOT
After Visit Summary   11/12/2018    Sulema Murphy    MRN: [id number]           Patient Information     Date Of Birth          1960        Visit Information        Provider Department      11/12/2018 3:30 PM Shashank Brown MD [facility]        Care Instructions    [facility]   Post Procedure Instructions    Today you had: Intercostal trigger point injections        Medications used:  lidocaine   bupivicaine   kenolog            Go to the emergency room if you develop any shortness of breath    Monitor the injection sites for signs and symptoms of infection-fever, chills, redness, swelling, warmth, or drainage to areas.    You may have soreness at injection sites for up to 24 hours.    You may apply ice to the painful areas to help minimize the discomfort of the needle pokes.    Do not apply heat to sites for at least 12 hours.    You may use anti-inflammatory medications or Tylenol for pain control if necessary    Pain Clinic phone number during work hours Monday-Friday:  [phone]    After hours provider line: [phone]              Follow-ups after your visit        Your next 10 appointments already scheduled     Nov 19, 2018 12:00 PM CST   [facility] with Samantha Gates PT   [facility] ([facility]  )    [address] [place] [address] [address]  [place] [address] [phone]            Nov 26, 2018 12:00 PM CST   [facility] with Samantha Gates PT   [facility] ([facility] [place]  )    [address] [place] [address] [address]  [place] [id number] [phone]              Who to contact     If you have questions or need follow up information about today's clinic visit or your schedule please contact [facility] directly at [phone].  Normal or non-critical lab and imaging results will be communicated to you by MyChart, letter or phone within 4 business  days after the clinic has received the results. If you do not hear from us within 7 days, please contact the clinic through [employer] or phone. If you have a critical or abnormal lab result, we will notify you by phone as soon as possible.  Submit refill requests through [employer] or call your pharmacy and they will forward the refill request to us. Please allow 3 business days for your refill to be completed.          Additional Information About Your Visit        [employer] Information     [employer] gives you secure access to your electronic health record. If you see a primary care provider, you can also send messages to your care team and make appointments. If you have questions, please call your primary care clinic.  If you do not have a primary care provider, please call [phone] and they will assist you.        Care EveryWhere ID     This is your Care EveryWhere ID. This could be used by other organizations to access your [facility] medical records  [id number]        Your Vitals Were     Pulse Pulse Oximetry BMI (Body Mass Index)             97 100% 20.27 kg/m2          Blood Pressure from Last 3 Encounters:   11/12/18 121/83   10/01/18 130/89   06/18/18 122/89    Weight from Last 3 Encounters:   11/12/18 54 kg (119 lb)   10/08/18 54.4 kg (120 lb)   10/01/18 54.8 kg (120 lb 14.4 oz)              Today, you had the following     No orders found for display       Primary Care Provider Office Phone # Fax #    [facility] [phone]       [address] [place] [address], [address]  [facility] [address]        Equal Access to Services     JOHANN BROUSSARD : Hadii ina hancocko Sosina, waaxda luqadaha, qaybta kaalmada fercho, mague rene. So [facility] [phone].    ATENCIÓN: Si habla español, tiene a julien disposición servicios gratuitos de asistencia lingüística. Llame al [phone].    We comply with applicable federal civil rights laws and [place] laws. We do  not discriminate on the basis of race, color, national origin, age, disability, sex, sexual orientation, or gender identity.            Thank you!     Thank you for choosing [facility]  for your care. Our goal is always to provide you with excellent care. Hearing back from our patients is one way we can continue to improve our services. Please take a few minutes to complete the written survey that you may receive in the mail after your visit with us. Thank you!             Your Updated Medication List - Protect others around you: Learn how to safely use, store and throw away your medicines at www.disposemymeds.org.          This list is accurate as of 11/12/18  4:15 PM.  Always use your most recent med list.                   Brand Name Dispense Instructions for use Diagnosis    CALCIUM PO      Take  by mouth.        cycloSPORINE 0.05 % ophthalmic emulsion    RESTASIS     1 drop every 12 hours.        GLUCOSAMINE-CHONDROITIN PO      Take  by mouth.        meloxicam 7.5 MG tablet    MOBIC    90 tablet    Take 1 tablet (7.5 mg) by mouth daily as needed for moderate pain or pain    Myofascial muscle pain, Thoracic neuralgia       METROLOTION 0.75 % Lotn   Generic drug:  metroNIDAZOLE      Apply thin layer to the affected area by topical route 2 times per day in the morning and evening        MULTI-VITAMINS PO      Take  by mouth.        SYNTHROID PO      Take 125 mcg by mouth daily

## 2018-11-12 NOTE — PATIENT INSTRUCTIONS
[facility]   Post Procedure Instructions    Today you had: Intercostal trigger point injections        Medications used:  lidocaine   bupivicaine   kenolog            Go to the emergency room if you develop any shortness of breath    Monitor the injection sites for signs and symptoms of infection-fever, chills, redness, swelling, warmth, or drainage to areas.    You may have soreness at injection sites for up to 24 hours.    You may apply ice to the painful areas to help minimize the discomfort of the needle pokes.    Do not apply heat to sites for at least 12 hours.    You may use anti-inflammatory medications or Tylenol for pain control if necessary    Pain Clinic phone number during work hours Monday-Friday:  [phone]    After hours provider line: [phone]

## 2018-11-13 NOTE — PROGRESS NOTES
Sulema was seen today for pain.    Diagnoses and all orders for this visit:    Myofascial muscle pain      Trigger points were identified by patient, and marked when appropriate.  The area was prepped with Chloroprep.    Using clean technique, injections were completed using a 25G, 3.5 inch needle under direct ultrasound guidance.  The pleura was visualized and avoided.  After negative aspiration, injection was completed.  A total of 5 locations were injected.  When possible, tissue was retracted from the chest wall to avoid lung injury.    Muscle groups injected:  External intercostal, internal intercostal, rhomboids    Injection solution contained:  5ml of 1% lidocaine, 5ml of 0.25% bupivacaine, and 40mg of Depomedrol.    Breath sounds were normal.         Shashank Brown MD  [facility]

## 2018-11-26 ENCOUNTER — THERAPY VISIT (OUTPATIENT)
Dept: PHYSICAL THERAPY | Facility: CLINIC | Age: 58
End: 2018-11-26
Payer: COMMERCIAL

## 2018-11-26 DIAGNOSIS — M25.511 CHRONIC RIGHT SHOULDER PAIN: ICD-10-CM

## 2018-11-26 DIAGNOSIS — M54.2 NECK PAIN: ICD-10-CM

## 2018-11-26 DIAGNOSIS — M54.6 RIGHT-SIDED THORACIC BACK PAIN: ICD-10-CM

## 2018-11-26 DIAGNOSIS — G89.29 CHRONIC RIGHT SHOULDER PAIN: ICD-10-CM

## 2018-11-26 PROCEDURE — 97110 THERAPEUTIC EXERCISES: CPT | Mod: GP | Performed by: PHYSICAL THERAPIST

## 2018-11-26 PROCEDURE — 97140 MANUAL THERAPY 1/> REGIONS: CPT | Mod: GP | Performed by: PHYSICAL THERAPIST

## 2018-11-26 PROCEDURE — 97112 NEUROMUSCULAR REEDUCATION: CPT | Mod: GP | Performed by: PHYSICAL THERAPIST

## 2018-12-14 ENCOUNTER — THERAPY VISIT (OUTPATIENT)
Dept: PHYSICAL THERAPY | Facility: CLINIC | Age: 58
End: 2018-12-14
Payer: COMMERCIAL

## 2018-12-14 DIAGNOSIS — G89.29 CHRONIC RIGHT SHOULDER PAIN: ICD-10-CM

## 2018-12-14 DIAGNOSIS — M54.2 NECK PAIN: ICD-10-CM

## 2018-12-14 DIAGNOSIS — M25.511 CHRONIC RIGHT SHOULDER PAIN: ICD-10-CM

## 2018-12-14 DIAGNOSIS — M54.6 RIGHT-SIDED THORACIC BACK PAIN: ICD-10-CM

## 2018-12-14 PROCEDURE — 97110 THERAPEUTIC EXERCISES: CPT | Mod: GP | Performed by: PHYSICAL THERAPIST

## 2018-12-14 PROCEDURE — 97140 MANUAL THERAPY 1/> REGIONS: CPT | Mod: GP | Performed by: PHYSICAL THERAPIST

## 2018-12-14 PROCEDURE — 97112 NEUROMUSCULAR REEDUCATION: CPT | Mod: GP | Performed by: PHYSICAL THERAPIST

## 2019-01-28 ENCOUNTER — OFFICE VISIT (OUTPATIENT)
Dept: PALLIATIVE MEDICINE | Facility: CLINIC | Age: 59
End: 2019-01-28
Payer: COMMERCIAL

## 2019-01-28 VITALS — HEART RATE: 85 BPM | SYSTOLIC BLOOD PRESSURE: 130 MMHG | DIASTOLIC BLOOD PRESSURE: 84 MMHG | OXYGEN SATURATION: 99 %

## 2019-01-28 DIAGNOSIS — M54.14 THORACIC NEURITIS: Primary | ICD-10-CM

## 2019-01-28 DIAGNOSIS — M79.18 MYOFASCIAL MUSCLE PAIN: ICD-10-CM

## 2019-01-28 PROCEDURE — 20553 NJX 1/MLT TRIGGER POINTS 3/>: CPT | Performed by: PAIN MEDICINE

## 2019-01-28 RX ORDER — NABUMETONE 500 MG/1
500 TABLET, FILM COATED ORAL 2 TIMES DAILY PRN
Qty: 60 TABLET | Refills: 1 | Status: SHIPPED | OUTPATIENT
Start: 2019-01-28 | End: 2019-09-09 | Stop reason: SINTOL

## 2019-01-28 ASSESSMENT — PAIN SCALES - GENERAL: PAINLEVEL: SEVERE PAIN (7)

## 2019-01-28 NOTE — PATIENT INSTRUCTIONS
.[facility]   Post Procedure Instructions    Today you had:  trigger point injections       Medications used:  lidocaine   bupivicaine   kenolog         Go to the emergency room if you develop any shortness of breath    Monitor the injection sites for signs and symptoms of infection-fever, chills, redness, swelling, warmth, or drainage to areas.    You may have soreness at injection sites for up to 24 hours.    You may apply ice to the painful areas to help minimize the discomfort of the needle pokes.    Do not apply heat to sites for at least 12 hours.    You may use anti-inflammatory medications or Tylenol for pain control if necessary    Pain Clinic phone number during work hours Monday-Friday:  [phone]    After hours provider line: [phone]

## 2019-01-28 NOTE — NURSING NOTE
Lungs clear upon auscultation post procedure.    Shavon Morris, SAMMYN, RN-BC  Patient Care Supervisor/Care Coordinator  [facility] [place]

## 2019-01-28 NOTE — PROGRESS NOTES
Sulema was seen today for pain.    Diagnoses and all orders for this visit:    Thoracic neuritis    Myofascial muscle pain        Trigger points were identified by patient, and marked when appropriate.  The area was prepped with Chloroprep.    Using clean technique, injections were completed using a 25G, 3.5 inch needle under direct ultrasound guidance.  The pleura was visualized and avoided.  After negative aspiration, injection was completed.  A total of 5 locations were injected.  When possible, tissue was retracted from the chest wall to avoid lung injury.    Muscle groups injected:  External intercostal, internal intercostal, rhomboids    Injection solution contained:  5ml of 1% lidocaine, 5ml of 0.25% bupivacaine, and 40mg of Depomedrol.    Breath sounds were normal.         Shashank Brown MD  [facility]

## 2019-03-04 ENCOUNTER — TRANSFERRED RECORDS (OUTPATIENT)
Dept: HEALTH INFORMATION MANAGEMENT | Facility: CLINIC | Age: 59
End: 2019-03-04

## 2019-04-12 PROBLEM — M54.2 NECK PAIN: Status: RESOLVED | Noted: 2018-10-15 | Resolved: 2019-04-12

## 2019-04-12 PROBLEM — M54.6 RIGHT-SIDED THORACIC BACK PAIN: Status: RESOLVED | Noted: 2018-10-15 | Resolved: 2019-04-12

## 2019-04-12 NOTE — PROGRESS NOTES
Discharge Note    Progress reporting period is from initial eval/last PN to Oct 15, 2018.     Sulema failed to return for next follow up visit and current status is unknown.  Please see information below for last relevant information on current status.  Patient seen for 1 visits.  SUBJECTIVE  Subjective changes noted by patient:  longstanding R N/aryan pain, 1 yr hx right scap/thoracic pain  .  Current pain level is  .     Previous pain level was   .   Changes in function:  Yes (See Goal flowsheet attached for changes in current functional level)  Adverse reaction to treatment or activity: None    OBJECTIVE  Changes noted in objective findings: see eval     ASSESSMENT/PLAN  Diagnosis: neck, shoulder and UBP   DIAGP:  The primary encounter diagnosis was Neck pain. Diagnoses of Right shoulder pain and Right-sided thoracic back pain were also pertinent to this visit.  Updated problem list and treatment plan:     STG/LTGs have been met or progress has been made towards goals:  Yes, please see goal flowsheet for most current information  Assessment of Progress: current status is unknown.    Last current status:     Self Management Plans:  HEP  I have re-evaluated this patient and find that the nature, scope, duration and intensity of the therapy is appropriate for the medical condition of the patient.  Sulema continues to require the following intervention to meet STG and LTG's:  HEP.    Recommendations:  Discharge with current home program.  Patient to follow up with MD as needed.    Please refer to the daily flowsheet for treatment today, total treatment time and time spent performing 1:1 timed codes.

## 2019-04-25 ENCOUNTER — HOSPITAL ENCOUNTER (OUTPATIENT)
Dept: MAMMOGRAPHY | Facility: CLINIC | Age: 59
Discharge: HOME | End: 2019-04-25
Attending: PHYSICIAN ASSISTANT | Admitting: PHYSICIAN ASSISTANT
Payer: COMMERCIAL

## 2019-04-25 DIAGNOSIS — Z12.31 VISIT FOR SCREENING MAMMOGRAM: ICD-10-CM

## 2019-04-25 PROCEDURE — 77063 BREAST TOMOSYNTHESIS BI: CPT

## 2019-05-31 ENCOUNTER — TELEPHONE (OUTPATIENT)
Dept: PALLIATIVE MEDICINE | Facility: CLINIC | Age: 59
End: 2019-05-31

## 2019-05-31 NOTE — TELEPHONE ENCOUNTER
Writer attempted to call pt, No answer.  LVM for Pt to call writer back at [phone].    Valentin Enriquez, RN  Care Coordinator   [facility] [place]

## 2019-05-31 NOTE — TELEPHONE ENCOUNTER
Patient actually had an MRI done.  did she have her get a report.  Otherwise we can request one if she signs a release of information.

## 2019-07-01 ENCOUNTER — TRANSFERRED RECORDS (OUTPATIENT)
Dept: HEALTH INFORMATION MANAGEMENT | Facility: CLINIC | Age: 59
End: 2019-07-01

## 2019-09-09 ENCOUNTER — OFFICE VISIT (OUTPATIENT)
Dept: PALLIATIVE MEDICINE | Facility: CLINIC | Age: 59
End: 2019-09-09
Payer: COMMERCIAL

## 2019-09-09 VITALS
BODY MASS INDEX: 19.55 KG/M2 | DIASTOLIC BLOOD PRESSURE: 78 MMHG | OXYGEN SATURATION: 97 % | SYSTOLIC BLOOD PRESSURE: 115 MMHG | WEIGHT: 114.8 LBS | HEART RATE: 90 BPM

## 2019-09-09 DIAGNOSIS — M54.12 CERVICAL RADICULOPATHY: Primary | ICD-10-CM

## 2019-09-09 DIAGNOSIS — M79.18 MYOFASCIAL MUSCLE PAIN: ICD-10-CM

## 2019-09-09 PROCEDURE — 99214 OFFICE O/P EST MOD 30 MIN: CPT | Performed by: PAIN MEDICINE

## 2019-09-09 RX ORDER — NAPROXEN 500 MG/1
1000 TABLET ORAL 2 TIMES DAILY WITH MEALS
COMMUNITY
End: 2023-05-09

## 2019-09-09 RX ORDER — ACETAMINOPHEN 500 MG
500 TABLET ORAL 2 TIMES DAILY
COMMUNITY
End: 2023-05-09

## 2019-09-09 ASSESSMENT — PAIN SCALES - GENERAL: PAINLEVEL: SEVERE PAIN (6)

## 2019-09-09 NOTE — PROGRESS NOTES
[facility] follow up    Chief Complaint:    Chief Complaint   Patient presents with     Pain     recs  - Further procedures recommended:    - Consider trigger point injection    - Consider Thoracic epidural( reasonable to start with thoracic epidural to rule out radiculopathy vs post herpetic neuralgia)   - Consider intercostal nerve block  - Medication Management:    - Consider start low dose muscle relaxer(zanaflex)   - consider starting low dose medication for neuropathic pain(100mg of Gabapentin tid)  - Diagnostic Studies: ordered  thoracic MRI   - Follow up: Pending decision on how to proceed.    Interval:  The pt reports her pain has gotten progressively worse. The pt denies any specific inciting event. The pt went to ortho Dr. Fernandez. The pt was developing more right sided neck pain radiation to her first three digits. The pain is a constant  Throbbing pain. Neg burning. + tingling and numbness.  The pain is worse with twisting her head.  The pain is worse with extension.  The pain is worse with most of her daily activities.  The pain is slightly better with rest.  Of note the pain is greatly affected her quality of life.  The patient is normally a very active person.    The pt saw ortho and discussed the concept of doing a surgery. The plan is to do a selective nerve root block at  [facility] as diagnositc to tool to decide about possible surgery   The pt has been doing PHYSICAL THERAPY  With minimal benefit    The pt also has been having headaches. The pain is only right side. The headaches are constant. The headaches are throbbing in nature. Neg aura. Neg blind spots. Neg vomiting. + nausea. + worse after biking. No other triggers. Neg tearing. Neg runny nose.     Pain history:  Of note patient broke her ribs in 1994 while pregnant.  She also had a breast reduction at the end of 1994 but no pain at that time.  Patient had shingles in 2001. Pain rating: intensity  Averages 5/10 on a 0-10  scale.      Current treatments include:  n as needed naproxen    Previous medication treatments included:  Capsaicin, lidocaine  Mobic(did not help)/nabumetone(out of body experience)  Gabapentin- weird experience   Other treatments have included:  Sulema Murphy has not been seen at a pain clinic in the past.  n/a  PT: Some help    Injections: Thoracic epidural- no benefit  tpi- significant benefit  Had a thoracic facet with sub I benefit   Past Medical History:  Past Medical History:   Diagnosis Date     Abdominal pain, unspecified site      Condyloma acuminatum     on colpo biopsies     Gastric ulcer      Gastro-oesophageal reflux disease      Hiatal hernia      History of blood transfusion      Hypothyroidism     Follows with Endocrinology     Kidney stones      Lupus (H)     Dermal lupus     Nonspecific elevation of levels of transaminase or lactic acid dehydrogenase (LDH)      Osteopenia      Pap smear of cervix not needed      PONV (postoperative nausea and vomiting)      Pyelonephritis      Rosacea      Rosacea      Sjoegren syndrome      Sjogren's disease (H)     follows with Rheum     Thyroid disease      Past Surgical History:  Past Surgical History:   Procedure Laterality Date     BIOPSY VAGINAL  03    BENIGN POLYPOID GRANULATION TISSUE      SECTION       COLONOSCOPY       COLPOSCOPY, BIOPSY, COMBINED      for ascus x 3; biopsies showed flat condyloma     HC INSERTION INTRAUTERINE DEVICE  1994    copper 7     HC REDUCTION OF LARGE BREAST       HC REMOVE INTRAUTERINE DEVICE  98     HC UGI ENDOSCOPY W EUS  2013    Procedure: COMBINED ENDOSCOPIC ULTRASOUND, ESOPHAGOSCOPY, GASTROSCOPY, DUODENOSCOPY (EGD);  Surgeon: Grace eHck MD;  Location: [facility]     HYSTERECTOMY      abdominal, with left oopherectomy for adhesions     LAPAROSCOPIC CHOLECYSTECTOMY  2013    Procedure: LAPAROSCOPIC CHOLECYSTECTOMY;  LAPAROSCOPIC CHOLECYSTECTOMY ;   Surgeon: Juan Carlos Bates MD;  Location:  [facility]     LAPAROTOMY EXPLORATORY      several; for adhesions     LASIK CUSTOMVUE BILATERAL  4/1/2014    Procedure: LASIK CUSTOMVUE BILATERAL;  BILATERAL CUSTOMVUE LASIK WITH INTRALASE;  Surgeon: Christi Kessler MD;  Location:  [facility]     left salpingoophorectomy  2002     SHOULDER SURGERY  2008     YOSELIN  2002     WAVESCAN SCREENING  4/1/2014    Procedure: WAVESCAN SCREENING;  BILATERAL WAVESCAN;  Surgeon: Christi Kessler MD;  [facility]     Medications:  Current Outpatient Medications   Medication Sig Dispense Refill     acetaminophen (TYLENOL) 500 MG tablet Take 500 mg by mouth 2 times daily       CALCIUM PO Take  by mouth.       cycloSPORINE (RESTASIS) 0.05 % ophthalmic emulsion 1 drop every 12 hours.       GLUCOSAMINE-CHONDROITIN PO Take  by mouth.       Levothyroxine Sodium (SYNTHROID PO) Take 125 mcg by mouth daily       metroNIDAZOLE (METROLOTION) 0.75 % LOTN Apply thin layer to the affected area by topical route 2 times per day in the morning and evening       Multiple Vitamin (MULTI-VITAMINS PO) Take  by mouth.       naproxen (NAPROSYN) 500 MG tablet Take 1,000 mg by mouth 2 times daily (with meals)       Allergies:     Allergies   Allergen Reactions     Codeine      Betadine Plus Rash     Penicillins Rash     Sulfa Drugs Rash     Tetracycline Rash     Social History:     Occupation/Schooling: rn  Tobacco use: no  Alcohol use: no  Drug use: no  History of chemical dependency treatment: no    Family history:  Family History   Problem Relation Age of Onset     Coronary Artery Disease Father      Coronary Artery Disease Maternal Grandfather      Cancer Unknown         other     Rheumatoid Arthritis Maternal Grandmother      Rheumatoid Arthritis Paternal Grandmother      Breast Cancer Maternal Aunt      Breast Cancer Paternal Aunt      Diabetes Paternal Grandfather      Diabetes Maternal Grandfather      Fractures Unknown         hip     Hyperlipidemia Father       Hyperlipidemia Sister      Hyperlipidemia Brother      Hyperlipidemia Maternal Grandmother      Hyperlipidemia Maternal Grandfather      Hyperlipidemia Paternal Grandmother      Hyperlipidemia Paternal Grandfather      Hypertension Father      Hypertension Maternal Grandfather      Hypertension Paternal Grandfather      Coronary Artery Disease Paternal Grandfather      Osteoporosis Mother      Osteoporosis Unknown      Thyroid Disease Mother      Thyroid Disease Sister      Thyroid Disease Maternal Grandmother      Thyroid Disease Maternal Grandfather      Thyroid Disease Paternal Grandmother      Thyroid Disease Paternal Grandfather      Thyroid Disease Unknown      Family history of headaches: no    Review of Systems:  Skin: negative  Eyes: negative  Ears/Nose/Throat: negative  Respiratory: No shortness of breath, dyspnea on exertion, cough, or hemoptysis  Cardiovascular: negative  Gastrointestinal: negative  Genitourinary: negative  Musculoskeletal: negative  Neurologic: negative  Psychiatric: negative  Hematologic/Lymphatic/Immunologic: negative  Endocrine: negative    Physical Exam:  Vitals:    09/09/19 1136   BP: 115/78   Pulse: 90   SpO2: 97%   Weight: 52.1 kg (114 lb 12.8 oz)     Exam:  Constitutional: healthy, alert and no distress  Head: normocephalic. Atraumatic.   Eyes: no redness or jaundice noted   ENT: oropharnx normal.  MMM.  Neck supple.    Cardiovascular: Negative JVD  Respiratory: Speaking in full sentences no accessory muscles use   gastrointestinal: soft, non-tender,   Skin: no suspicious lesions or rashes  Psychiatric: mentation appears normal and affect normal/bright    Musculoskeletal exam:  Gait/Station/Posture: wnl  Cervical spine: ROMwnl  + spurling on the right  Mild ttp       Thoracic spine:  tenderness to deep palpation positive approximately T10-T12   + phalens on the right neg tinnels   Neurologic exam:  CN:  Cranial nerves 2-12 are normal  Motor:  5/5 UE  Reflexes:     Biceps:      +2    Sensory:  (upper and lower extremities):   Light touch: Decreased   Allodynia: absent       Diagnostic tests:  C5-6: right foraminal herniation to encouraging c6 root  3/4  Repeat thoracic MRI  T9-10 bilateral thoracic facet hypertrophy    MRI cervical reviewed    Assessment/Plan:  Sulema Murphy is a 57 year old female who presents with the complaints of right sided neck pain radiating to her upper extremity.  Sulema was seen today for pain.    Diagnoses and all orders for this visit:    Cervical radiculopathy      - Further procedures recommended:    - Plans for cervical epidural tomorrow(possibly a selective nerve block at C5-6)  - Medication Management:    - reasonable to continue acetaminophen/naproxen    - consider starting lyrica       Follow up:    - please contact me after the procedure to discuss plan going forward.         Total time spent was 30 minutes, and more than 50% of face to face time was spent counseling and/or coordination of care regarding principles of multidisciplinary care and medication management right neck pain radiating to her upper extremity    Shashank Brown MD  [facility]

## 2019-09-09 NOTE — PATIENT INSTRUCTIONS
- Further procedures recommended:    - Plans for cervical epidural tomorrow(possibly a selective nerve block at C5-6)  - Medication Management:    - reasonable to continue acetaminophen/naproxen    - consider starting lyrica     Follow up:    - please contact me after the procedure to discuss plan going forward.     ----------------------------------------------------------------  Clinic Number:  [phone]     Call with any questions about your care and for scheduling assistance.     Calls are returned Monday through Friday between 8 AM and 4:30 PM. We usually get back to you within 2 business days depending on the issue/request.    If we are prescribing your medications:    For opioid medication refills, call the clinic or send a [employer] message 7 days in advance.  Please include:    Name of requested medication    Name of the pharmacy.    For non-opioid medications, call your pharmacy directly to request a refill. Please allow 3-4 days to be processed.     Per [place] State Law:    All controlled substance prescriptions must be filled within 30 days of being written.      For those controlled substances allowing refills, pickup must occur within 30 days of last fill.      We believe regular attendance is key to your success in our program!      Any time you are unable to keep your appointment we ask that you call us at least 24 hours in advance to cancel.This will allow us to offer the appointment time to another patient.     Multiple missed appointments may lead to dismissal from the clinic.

## 2019-10-31 ENCOUNTER — HEALTH MAINTENANCE LETTER (OUTPATIENT)
Age: 59
End: 2019-10-31

## 2020-06-04 ENCOUNTER — HOSPITAL ENCOUNTER (OUTPATIENT)
Dept: MAMMOGRAPHY | Facility: CLINIC | Age: 60
Discharge: HOME | End: 2020-06-04
Attending: PHYSICIAN ASSISTANT | Admitting: PHYSICIAN ASSISTANT
Payer: COMMERCIAL

## 2020-06-04 DIAGNOSIS — Z12.31 VISIT FOR SCREENING MAMMOGRAM: ICD-10-CM

## 2020-06-04 PROCEDURE — 77063 BREAST TOMOSYNTHESIS BI: CPT

## 2021-01-15 ENCOUNTER — HEALTH MAINTENANCE LETTER (OUTPATIENT)
Age: 61
End: 2021-01-15

## 2021-09-05 ENCOUNTER — HEALTH MAINTENANCE LETTER (OUTPATIENT)
Age: 61
End: 2021-09-05

## 2021-11-16 ENCOUNTER — TRANSFERRED RECORDS (OUTPATIENT)
Dept: HEALTH INFORMATION MANAGEMENT | Facility: CLINIC | Age: 61
End: 2021-11-16

## 2021-11-30 ENCOUNTER — TRANSFERRED RECORDS (OUTPATIENT)
Dept: HEALTH INFORMATION MANAGEMENT | Facility: CLINIC | Age: 61
End: 2021-11-30

## 2022-01-17 LAB
ALT SERPL-CCNC: 38 IU/L (ref 0–32)
AST SERPL-CCNC: 42 IU/L (ref 0–40)

## 2022-02-15 ENCOUNTER — TRANSFERRED RECORDS (OUTPATIENT)
Dept: HEALTH INFORMATION MANAGEMENT | Facility: CLINIC | Age: 62
End: 2022-02-15

## 2022-02-20 ENCOUNTER — HEALTH MAINTENANCE LETTER (OUTPATIENT)
Age: 62
End: 2022-02-20

## 2022-03-01 ENCOUNTER — TRANSFERRED RECORDS (OUTPATIENT)
Dept: HEALTH INFORMATION MANAGEMENT | Facility: CLINIC | Age: 62
End: 2022-03-01

## 2022-03-01 LAB — HEP C HIM: NORMAL

## 2022-04-05 ENCOUNTER — HOSPITAL ENCOUNTER (OUTPATIENT)
Dept: MAMMOGRAPHY | Facility: CLINIC | Age: 62
Discharge: HOME | End: 2022-04-05
Attending: PHYSICIAN ASSISTANT | Admitting: PHYSICIAN ASSISTANT
Payer: COMMERCIAL

## 2022-04-05 DIAGNOSIS — Z12.31 VISIT FOR SCREENING MAMMOGRAM: ICD-10-CM

## 2022-04-05 PROCEDURE — 77067 SCR MAMMO BI INCL CAD: CPT

## 2022-10-23 ENCOUNTER — HEALTH MAINTENANCE LETTER (OUTPATIENT)
Age: 62
End: 2022-10-23

## 2022-10-27 ENCOUNTER — ANCILLARY PROCEDURE (OUTPATIENT)
Dept: ULTRASOUND IMAGING | Facility: CLINIC | Age: 62
End: 2022-10-27
Attending: NURSE PRACTITIONER
Payer: COMMERCIAL

## 2022-10-27 ENCOUNTER — TRANSFERRED RECORDS (OUTPATIENT)
Dept: HEALTH INFORMATION MANAGEMENT | Facility: CLINIC | Age: 62
End: 2022-10-27

## 2022-10-27 DIAGNOSIS — R10.11 RUQ PAIN: ICD-10-CM

## 2022-10-27 PROCEDURE — 76700 US EXAM ABDOM COMPLETE: CPT

## 2022-11-09 ENCOUNTER — TRANSFERRED RECORDS (OUTPATIENT)
Dept: HEALTH INFORMATION MANAGEMENT | Facility: CLINIC | Age: 62
End: 2022-11-09

## 2022-11-09 ENCOUNTER — HOSPITAL ENCOUNTER (OUTPATIENT)
Dept: NUCLEAR MEDICINE | Facility: CLINIC | Age: 62
Setting detail: NUCLEAR MEDICINE
Discharge: HOME | End: 2022-11-09
Attending: NURSE PRACTITIONER | Admitting: NURSE PRACTITIONER
Payer: COMMERCIAL

## 2022-11-09 DIAGNOSIS — R10.11 RUQ PAIN: ICD-10-CM

## 2022-11-09 PROCEDURE — A9541 TC99M SULFUR COLLOID: HCPCS | Performed by: NURSE PRACTITIONER

## 2022-11-09 PROCEDURE — 343N000001 HC RX 343: Performed by: NURSE PRACTITIONER

## 2022-11-09 PROCEDURE — 78264 GASTRIC EMPTYING IMG STUDY: CPT

## 2022-11-09 RX ADMIN — Medication 2 MILLICURIE: at 08:04

## 2022-11-14 ENCOUNTER — TRANSFERRED RECORDS (OUTPATIENT)
Dept: HEALTH INFORMATION MANAGEMENT | Facility: CLINIC | Age: 62
End: 2022-11-14

## 2022-12-09 ENCOUNTER — MEDICAL CORRESPONDENCE (OUTPATIENT)
Dept: HEALTH INFORMATION MANAGEMENT | Facility: CLINIC | Age: 62
End: 2022-12-09

## 2022-12-12 ENCOUNTER — TRANSCRIBE ORDERS (OUTPATIENT)
Dept: OTHER | Age: 62
End: 2022-12-12

## 2022-12-12 ENCOUNTER — DOCUMENTATION ONLY (OUTPATIENT)
Dept: GASTROENTEROLOGY | Facility: CLINIC | Age: 62
End: 2022-12-12

## 2022-12-12 DIAGNOSIS — R10.11 RUQ PAIN: Primary | ICD-10-CM

## 2022-12-12 NOTE — PROGRESS NOTES
Called [facility] to request past records related to recent referral. Left VM with direct call back number.     Clinic Information:   Annamarie Alcala NP  [facility]  [address], [place] [address]  Phone: [phone]   Fax: [phone] sk

## 2022-12-13 NOTE — PROGRESS NOTES
Received a call from [facility]; requested past records pertaining to referral. Provided fax number.      Clinic Information:   Annamarie Alcala NP  [facility]  [address], [place] [address]  Phone: [phone]   Fax: [phone] sk

## 2022-12-20 ENCOUNTER — TELEPHONE (OUTPATIENT)
Dept: GASTROENTEROLOGY | Facility: CLINIC | Age: 62
End: 2022-12-20

## 2022-12-20 NOTE — TELEPHONE ENCOUNTER
Advanced Endoscopy     Referring provider:   Annamarie Alcala NP @ [facility]  Phone: [phone], Fax: [phone]    Referred to: [facility]      Provider Requested:   Dr. Luevano    Referral Received: 12/20/22       Records received: Media Tab     Images received: none    Evaluation for:   RUQ pain, question SOD     Clinical History (per RN review):     [facility] records under Media tab:      Last [facility] visit October 17, 2022  Symptoms or Concerns This is a 62-year-old, who is status post cholecystectomy for microlithiasis in 2013, chronic transaminase elevation since at least 2013 (mild), erosive gastropathy (2021), who is seen today in followup regarding thoracic back pain, which radiates to the right upper quadrant, episodes of severe intermittent right upper quadrant abdominal pain accompanied by nausea, vomiting, belching, diarrhea, and chronic nausea symptoms.Our last visit was in March 2022. She was working with a pain physician at [facility] and was going to be scheduled for a nerve ablation. She wanted to see what effect this had on her symptoms prior to moving forward with any further GI workup at that time. She has had trigger point injections, and a rhizotomy ablation in the thoracic spine, which helped the thoracic pain, but she continues to have chronic back pain. The chronic back pain is rated at a 5 to 6/10 and can radiate to the right upper quadrant.     Elevated liver enzymes in April 20, 2013 - AST =49, ALT= 63    Elevated liver enzymes February 2016-ALT= 67    MRCP completed 2/15/2022: Impression no acute abnormality seen, cholecystectomy no choledocholithiasis no significant biliary dilatation    CT abdomen pelvis completed November 2021-no acute findings    Colonoscopy in 2016-repeat in 10 years  EGD done March 2013  Gastric emptying completed November 2022-normal    MD review date: 12/20/22    MD Decision for clinic consultation/Orders:            Referral updates/Patient contacted:

## 2022-12-21 NOTE — TELEPHONE ENCOUNTER
Per Dr. Luevano and Dr. Ibarra  Reject referral, pt should follow  Up with GI functional providers at [facility]    Lazara Couch, RN Care Coordinator

## 2023-04-02 ENCOUNTER — HEALTH MAINTENANCE LETTER (OUTPATIENT)
Age: 63
End: 2023-04-02

## 2023-05-03 ENCOUNTER — HOSPITAL ENCOUNTER (OUTPATIENT)
Dept: MAMMOGRAPHY | Facility: CLINIC | Age: 63
Discharge: HOME | End: 2023-05-03
Attending: FAMILY MEDICINE | Admitting: FAMILY MEDICINE
Payer: COMMERCIAL

## 2023-05-03 DIAGNOSIS — Z12.31 VISIT FOR SCREENING MAMMOGRAM: ICD-10-CM

## 2023-05-03 PROCEDURE — 77067 SCR MAMMO BI INCL CAD: CPT

## 2023-05-09 ENCOUNTER — OFFICE VISIT (OUTPATIENT)
Dept: VASCULAR SURGERY | Facility: CLINIC | Age: 63
End: 2023-05-09
Payer: COMMERCIAL

## 2023-05-09 DIAGNOSIS — I78.1 SPIDER VEINS: Primary | ICD-10-CM

## 2023-05-09 PROCEDURE — 99203 OFFICE O/P NEW LOW 30 MIN: CPT | Performed by: SURGERY

## 2023-05-09 RX ORDER — LEVOTHYROXINE SODIUM 112 MCG
TABLET ORAL
COMMUNITY
Start: 2023-04-04

## 2023-05-09 NOTE — LETTER
2023         RE: Sulema Murphy  [address] [address]        Dear Colleague,    Thank you for referring your patient, Sulema Murphy, to the [facility] [place]. Please see a copy of my visit note below.    VEINSOLUTIONS CONSULTATION    HPI:    Sulema Murphy is a pleasant 62 year old self-referred female who presents with bilateral lower extremity varicose veins and spider telangiectasias with pain.  She is also concerned with the cosmetic appearance of the veins.  She has had them for years, has tried compression years ago but did not find much improvement in her symptoms.  She is an avid exerciser and notices some calf aching after she has exercised.  The pain is worse after having been on her feet for long periods of time and improves with elevation of the leg.    She has a history of deep vein thrombosis, superficial thrombophlebitis or hemorrhage.    Family history is significant for varicose veins in her mother, sisters and brothers.  There is no family history of venous thromboembolism.    PAST MEDICAL HISTORY:   Past Medical History:   Diagnosis Date     Abdominal pain, unspecified site      Condyloma acuminatum     on colpo biopsies     Gastric ulcer      Gastro-oesophageal reflux disease      Hiatal hernia      History of blood transfusion      Hypothyroidism     Follows with Endocrinology     Kidney stones      Lupus (H)     Dermal lupus     Nonspecific elevation of levels of transaminase or lactic acid dehydrogenase (LDH)      Osteopenia      Pap smear of cervix not needed      PONV (postoperative nausea and vomiting)      Pyelonephritis      Rosacea      Rosacea      Sjoegren syndrome      Sjogren's disease (H)     follows with Rheum     Thyroid disease        PAST SURGICAL HISTORY:   Past Surgical History:   Procedure Laterality Date     BIOPSY VAGINAL  03    BENIGN POLYPOID GRANULATION TISSUE       SECTION  1994     COLONOSCOPY       COLPOSCOPY, BIOPSY, COMBINED  1997    for ascus x 3; biopsies showed flat condyloma     HC INSERTION INTRAUTERINE DEVICE  2/1994    copper 7     HC REDUCTION OF LARGE BREAST  1994     HC UGI ENDOSCOPY W EUS  4/30/2013    Procedure: COMBINED ENDOSCOPIC ULTRASOUND, ESOPHAGOSCOPY, GASTROSCOPY, DUODENOSCOPY (EGD);  Surgeon: Grace Heck MD;  Location:  GI     HYSTERECTOMY  2002    abdominal, with left oopherectomy for adhesions     LAPAROSCOPIC CHOLECYSTECTOMY  5/21/2013    Procedure: LAPAROSCOPIC CHOLECYSTECTOMY;  LAPAROSCOPIC CHOLECYSTECTOMY ;  Surgeon: Juan Carlos Bates MD;  Location:  [facility]     LAPAROTOMY EXPLORATORY      several; for adhesions     LASIK CUSTOMVUE BILATERAL  4/1/2014    Procedure: LASIK CUSTOMVUE BILATERAL;  BILATERAL CUSTOMVUE LASIK WITH INTRALASE;  Surgeon: Christi Kessler MD;  Location:  [facility]     left salpingoophorectomy  2002     SHOULDER SURGERY  2008     YOSELIN  2002     WAVESCAN SCREENING  4/1/2014    Procedure: WAVESCAN SCREENING;  BILATERAL WAVESCAN;  Surgeon: Christi Kessler MD;  Location:  [facility]     ZZHC REMOVE INTRAUTERINE DEVICE  4/29/98       FAMILY HISTORY:   Family History   Problem Relation Age of Onset     Coronary Artery Disease Father      Coronary Artery Disease Maternal Grandfather      Cancer Unknown         other     Rheumatoid Arthritis Maternal Grandmother      Rheumatoid Arthritis Paternal Grandmother      Breast Cancer Maternal Aunt      Breast Cancer Paternal Aunt      Diabetes Paternal Grandfather      Diabetes Maternal Grandfather      Fractures Unknown         hip     Hyperlipidemia Father      Hyperlipidemia Sister      Hyperlipidemia Brother      Hyperlipidemia Maternal Grandmother      Hyperlipidemia Maternal Grandfather      Hyperlipidemia Paternal Grandmother      Hyperlipidemia Paternal Grandfather      Hypertension Father      Hypertension Maternal Grandfather      Hypertension Paternal Grandfather       Coronary Artery Disease Paternal Grandfather      Osteoporosis Mother      Osteoporosis Unknown      Thyroid Disease Mother      Thyroid Disease Sister      Thyroid Disease Maternal Grandmother      Thyroid Disease Maternal Grandfather      Thyroid Disease Paternal Grandmother      Thyroid Disease Paternal Grandfather      Thyroid Disease Unknown        SOCIAL HISTORY:   Social History     Tobacco Use     Smoking status: Never     Smokeless tobacco: Never   Vaping Use     Vaping status: Not on file   Substance Use Topics     Alcohol use: Yes     Alcohol/week: 0.0 standard drinks of alcohol     Comment: minimal - beer on occasion       REVIEW OF SYSTEMS: Review Of Systems  Skin: negative  Eyes: Ptosis  Ears/Nose/Throat: negative  Respiratory: No shortness of breath, dyspnea on exertion, cough, or hemoptysis  Cardiovascular: negative  Gastrointestinal: negative  Genitourinary: negative  Musculoskeletal: Back pain, leg pain, ankle swelling  Neurologic: negative  Psychiatric: negative  Hematologic/Lymphatic/Immunologic: negative  Endocrine: Hypothyroidism      Vital signs:  There were no vitals taken for this visit.    Current Outpatient Medications   Medication Sig Dispense Refill     CALCIUM PO Take  by mouth.       Multiple Vitamin (MULTI-VITAMINS PO) Take  by mouth.       SYNTHROID 112 MCG tablet          PHYSICAL EXAM:  General: Pleasant, NAD.   HEENT: Normocephalic, atraumatic, external ears and nose normal.   Respiratory: Normal respiratory effort.   Cardiovascular: Pulse is regular.   Musculoskeletal: Gait and station normal.  The joints of her fingers and toes without deformity.  There is no cyanosis of her nailbeds.   EXTREMITIES: Right lower extremity: Few small, palpable veins on the posteromedial right calf.  Scattered telangiectasias over the lateral and posterior lateral thighs and a few scattered on the legs.  No edema or stasis changes.    Left lower extremity: Palpable, small veins on the posteromedial  left calf.  Scattered telangiectasias over the thighs and legs.  No edema or stasis changes..    PULSES: R/L (3=normal pulse, 0=no palpable pulse) dorsalis pedis: 3/3; posterior tibial: 3/3.      Neurologic: Grossly normal  Psychiatric: Mood, affect, judgment and insight are normal     ASSESSMENT:  Bilateral lower extremity telangiectasias and small calf veins with some associated pain.  We discussed the lower extremity vein anatomy, the pathophysiology of venous insufficiency and the option of continued conservative measures with very small risks of superficial thrombophlebitis, bleeding and progression of the disease process.    I see no reason to obtain venous competency studies as her symptoms are not significant enough and varicosities stairs.  We discussed treating the veins for cosmetic purposes utilizing polidocanol sclerotherapy.  Details of the procedure including risks of allergic reaction, deep antibiosis, ulceration, hyperpigmentation were all discussed.  The patient voiced understanding of the discussion and her questions were answered.    Her daughter is getting  in the end of August of this year and she plans on wearing a skirt.  Therefore, she hopes that we can improve the appearance of the veins in that regard.  I feel that we could probably have 2 sessions of cosmetic sclerotherapy if we can get her on the cancellation list.  She should make some improvement with that.  She understands that she could have hyperpigmentation that can take up to a year to fade, over.    PLAN:  Possible bilateral extremity cosmetic sclerotherapy     Silvestre Dick MD    Dictated using Dragon voice recognition software which may result in transcription errors        VEIN CLINIC LEG DRAWING:                  Again, thank you for allowing me to participate in the care of your patient.        Sincerely,        Silvestre Dick MD

## 2023-05-09 NOTE — NURSING NOTE
Patient Reported symptoms:    Right leg   Heaviness A little of the time   Achiness Some of the time   Swelling A little of the time   Throbbing A little of the time   Itching None of the time   Appearance Slightly noticeable   Impact on work/activities Symptoms but full able to participate    Left Leg   Heaviness A little of the time   Achiness Some of the time   Swelling A little of the time   Throbbing A little of the time   Itching None of the time   Appearance Slightly noticeable   Impact on work/activities Symptoms but full able to participate

## 2023-05-09 NOTE — PATIENT INSTRUCTIONS
Sclerotherapy: Pre-Treatment Instructions    Recommended Sessions:  2-4 treatment sessions    Pricing: Full session - $407                 *Payment is due at the time of visit following the treatment    Time Required per Treatment Session - About 45 minutes  Please come in 15 minutes before your scheduled appointment.  30 min.  Sclerotherapy treatments last approximately 30 minutes.  5 min.    A staff member will wipe your legs off with warm water and dry them with a wash cloth.                 Then you can put your compression hose on, get dressed and check out.  10 min.  After your treatment, you will be asked to walk around for 10 minutes before you get in your car.    Medications  Five days before your appointment, discontinue aspirin (Bufferin, Anacin, etc.) and Ibuprofen (Motrin, Advil, Aleve, etc.) to reduce bruising. Resume these medications the day following the treatment.    Leg Preparation  Do not shave your legs or apply any oil, lotion or powder the night before or the day of your treatment.    Clothing  Shorts:  Bring a pair of loose, comfortable shorts to wear during your treatment (or you can choose to wear ours). Shoes: Bring comfortable shoes to accommodate the compression hose after your treatment. Do not wear flip-flops or thong-style sandals unless you have open-toe compression hose.    Photographs  Photos will be taken before each treatment. This helps monitor your progress.    Injections  The physician will inject your veins with the sclerotherapy solution chosen to meet your specific needs.    Compression Hose  Please bring your compression hose if you have them. They may also be reserved for you at our clinic. Compression hose must be worn immediately after each sclerotherapy treatment.  The hose must be compression level 20-30, and they must be worn for 24 hours straight after your treatment.  If you have never worn compression hose before, a staff member will teach you how to put them  on.             You cannot have a treatment without compression hose.               They are critical to the success of your treatment!    You may purchase your compression hose from us. We will measure you and have the hose available when you come for your treatment.    Cancellation and Rescheduling  If you need to cancel or reschedule your sclerotherapy treatment, please give our office at least 24 hour notice.    Sclerotherapy: Basic Information        What is sclerotherapy?  Sclerotherapy is a treatment for  spider  veins.  Spider  veins are small veins just under your skin that can look red, blue or purple. Most  spider  veins are only a cosmetic problem.  Spider  veins are not useful and treating them will not affect your circulation.    How does sclerotherapy work?  1.  Injections: A very small needle is used to inject a solution into the  spider  veins. The solution irritates the cells that line the vein walls. This causes the veins to collapse. The vein walls to stick together and they can no longer carry blood. Different solutions are used based on the size of the veins.  2.  Compression:  The spider veins are kept collapsed by wearing compression stockings. Your body will break down and absorb the treated veins. You wear the compression hose for 24 hours after the treatment and then for 4 more days during your waking hours only.    How does the body heal after sclerotherapy?  The process is similar to how your body heals after a bad bruise. It takes 4-6 weeks or more for the healing to be complete. When the healing is complete, the vein is no longer visible. It may take more than one treatment.    How do I get the best results?  It is important to follow the post-sclerotherapy instructions. The best results require time and patience. The injection sites will continue to heal and fade for months after a treatment. Please discuss your expectations with your doctor to keep them realistic. Your doctor will do  everything possible to meet or exceed your expectations.    How many treatments are needed?  After your initial exam, your doctor will give you an estimate of the number of treatments that may be required. It depends upon the size, type, and quantity of your  spider  veins and on the doctor's assessment, your history and expectations. You may end up needing fewer or more treatments.    How soon can I have another treatment?  Additional treatments are scheduled every 4-6 weeks to allow time for the body to respond to the previous treatment.    Common Side Effects:  Itching  The areas that were injected may itch. This is usually mild and lasts less than a day. Do not use lotions or creams on your legs until the injection sites have healed over.    Pain  It is common to have some tenderness at the injection sites. Injection of the solution can be uncomfortable, but is usually well tolerated by most patients. The tenderness is temporary, lasting 24 hours at most. Tylenol or Ibuprofen can be used, if needed, following the product directions.    Bruising  This may occur at the injections sites. Bruising may be minimized by avoiding Aspirin and Ibuprofen products for five days before each treatment session.    Hyperpigmentation  A light brown discoloration of the skin may develop along the veins in the areas injected. Approximately 20-30% of patients treated note the discoloration (which is lighter and less obvious than the veins that are being treated). The hyperpigmentation usually fades in a couple of weeks, but may take several months to a year to totally resolve. There is a 1% chance of hyperpigmentation continuing after one year.    Trapped blood  A small amount of blood may become trapped and hardened in the veins. This may feel like a knot or cord and it may look dark blue or bruised. This is a common occurrence. You may need to return before your next treatment so this area can be drained to remove the trapped  blood. This will reduce the hyperpigmentation that can occur. The chance of this occurring can be decreased with proper use of compression hose after your treatment.    Matting  Matting is the formation of new, fine  spider  veins in the area injected.  It occurs in approximately 10% of patients injected. The exact reason for this is unknown. If untreated, the matting usually resolves in 3 to 12 months, but very rarely, it can be permanent. If the matting does not fade, it can be re-injected.    Rare Side Effects:  Ulceration at injection sites  Very rarely, a small ulcer will occur at the site where a vein is injected. An ulcer can take 4 to 6 weeks to completely heal. A small scar may result.    Allergic reactions  There is a very rare incidence of an allergic reaction to the solution injected. You will be observed for such reaction and will be treated appropriately should it occur. Please inform us of any allergy history.    Pulmonary embolus/Deep vein thrombosis  This is a blood clot which moves to the lungs/a blood clot in the deep vein system. There is an extraordinarily low incidence of this complication.      SCLEROTHERAPY AFTER CARE  Immediately:  After treatment, walk for 10-15 minutes before getting in your car.  If your trip home is more than 1 hour, stop and walk around for 5-10 minutes. Avoid sitting or standing for extended periods.   First 24 hours: Wear your compression continuously, even while in bed. After the 24 hours, you may shower if you want to. Put your hose back on, unless you are going to bed. You should NOT wear compression to bed after the first 24 hours. You may fly the next day, but wear your compression.   For 5 days: Wear the compression hose for waking hours only. You may continue to wear them longer than 5 days if you prefer.   For days 5-7: Walking is encouraged, as it promotes efficient circulation in your veins. You may do activities that raise your heart rate, but do NOT run,  jog, do high impact aerobics, or weight lifting. After 7 days, no activity restrictions.  Shaving: Wait a few days to shave or apply lotion.   Bathing: Do NOT take hot baths or sit in a hot tub for 7-10 days.    For 1 year: Wear SPF 30 sunscreen on your legs when in the sun. This is very important! It helps prevent darkening of the skin at the injection sites.   Medications: You may resume your usual medications, including aspirin or ibuprofen.    Common Things to Expect       Compression must be worn for the first 24 hours and then during the day for 5-7 days.    If larger veins are treated with ultrasound-guided sclerotherapy, you will have redness, firmness, tenderness, and swelling.  This firmness and tenderness may take 3-6 months to resolve. Ibuprofen and compression hose will aid in this process.    You will have bruising that can last up to 3 weeks. Most fading of the veins will occur between 3 and 6 weeks after treatment.    You may notice brown discoloration (hyperpigmentation) at the treatment site.  This should fade with time, but will take 3 months to 1 year to fully heal.     Some treated veins may look darker because of trapped blood within the vein. This trapped blood can be removed at a minimum of 1 month following treatment. Larger veins are more likely to develop trapped blood.    It is very important for you to use at least SPF 30 sunscreen in order to help prevent the discoloration of your skin.    Migraines rarely occur following sclerotherapy, but are more likely in patients with a history of migraines.  Treat as you would any other migraine.      [email or web address] last reviewed this educational content on 11/1/2019 2000-2021 [employer]. All rights reserved. This information is not intended as a substitute for professional medical care. Always follow your healthcare professional's instructions.

## 2023-05-09 NOTE — PROGRESS NOTES
VEINSOLUTIONS CONSULTATION    HPI:    Sulema Murphy is a pleasant 62 year old self-referred female who presents with bilateral lower extremity varicose veins and spider telangiectasias with pain.  She is also concerned with the cosmetic appearance of the veins.  She has had them for years, has tried compression years ago but did not find much improvement in her symptoms.  She is an avid exerciser and notices some calf aching after she has exercised.  The pain is worse after having been on her feet for long periods of time and improves with elevation of the leg.    She has a history of deep vein thrombosis, superficial thrombophlebitis or hemorrhage.    Family history is significant for varicose veins in her mother, sisters and brothers.  There is no family history of venous thromboembolism.    PAST MEDICAL HISTORY:   Past Medical History:   Diagnosis Date     Abdominal pain, unspecified site      Condyloma acuminatum     on colpo biopsies     Gastric ulcer      Gastro-oesophageal reflux disease      Hiatal hernia      History of blood transfusion      Hypothyroidism     Follows with Endocrinology     Kidney stones      Lupus (H)     Dermal lupus     Nonspecific elevation of levels of transaminase or lactic acid dehydrogenase (LDH)      Osteopenia      Pap smear of cervix not needed      PONV (postoperative nausea and vomiting)      Pyelonephritis      Rosacea      Rosacea      Sjoegren syndrome      Sjogren's disease (H)     follows with Rheum     Thyroid disease        PAST SURGICAL HISTORY:   Past Surgical History:   Procedure Laterality Date     BIOPSY VAGINAL  03    BENIGN POLYPOID GRANULATION TISSUE      SECTION       COLONOSCOPY       COLPOSCOPY, BIOPSY, COMBINED      for ascus x 3; biopsies showed flat condyloma     HC INSERTION INTRAUTERINE DEVICE  1994    copper 7     HC REDUCTION OF LARGE BREAST       HC UGI ENDOSCOPY W EUS  2013    Procedure: COMBINED  ENDOSCOPIC ULTRASOUND, ESOPHAGOSCOPY, GASTROSCOPY, DUODENOSCOPY (EGD);  Surgeon: Grace Heck MD;  Location:  GI     HYSTERECTOMY  2002    abdominal, with left oopherectomy for adhesions     LAPAROSCOPIC CHOLECYSTECTOMY  5/21/2013    Procedure: LAPAROSCOPIC CHOLECYSTECTOMY;  LAPAROSCOPIC CHOLECYSTECTOMY ;  Surgeon: Juan Carlos Bates MD;  Location:  [facility]     LAPAROTOMY EXPLORATORY      several; for adhesions     LASIK CUSTOMVUE BILATERAL  4/1/2014    Procedure: LASIK CUSTOMVUE BILATERAL;  BILATERAL CUSTOMVUE LASIK WITH INTRALASE;  Surgeon: Christi Kessler MD;  Location:  [facility]     left salpingoophorectomy  2002     SHOULDER SURGERY  2008     YOSELIN  2002     WAVESCAN SCREENING  4/1/2014    Procedure: WAVESCAN SCREENING;  BILATERAL WAVESCAN;  Surgeon: Christi Kessler MD;  Location:  [facility]     ZZHC REMOVE INTRAUTERINE DEVICE  4/29/98       FAMILY HISTORY:   Family History   Problem Relation Age of Onset     Coronary Artery Disease Father      Coronary Artery Disease Maternal Grandfather      Cancer Unknown         other     Rheumatoid Arthritis Maternal Grandmother      Rheumatoid Arthritis Paternal Grandmother      Breast Cancer Maternal Aunt      Breast Cancer Paternal Aunt      Diabetes Paternal Grandfather      Diabetes Maternal Grandfather      Fractures Unknown         hip     Hyperlipidemia Father      Hyperlipidemia Sister      Hyperlipidemia Brother      Hyperlipidemia Maternal Grandmother      Hyperlipidemia Maternal Grandfather      Hyperlipidemia Paternal Grandmother      Hyperlipidemia Paternal Grandfather      Hypertension Father      Hypertension Maternal Grandfather      Hypertension Paternal Grandfather      Coronary Artery Disease Paternal Grandfather      Osteoporosis Mother      Osteoporosis Unknown      Thyroid Disease Mother      Thyroid Disease Sister      Thyroid Disease Maternal Grandmother      Thyroid Disease Maternal Grandfather      Thyroid Disease Paternal Grandmother       Thyroid Disease Paternal Grandfather      Thyroid Disease Unknown        SOCIAL HISTORY:   Social History     Tobacco Use     Smoking status: Never     Smokeless tobacco: Never   Vaping Use     Vaping status: Not on file   Substance Use Topics     Alcohol use: Yes     Alcohol/week: 0.0 standard drinks of alcohol     Comment: minimal - beer on occasion       REVIEW OF SYSTEMS: Review Of Systems  Skin: negative  Eyes: Ptosis  Ears/Nose/Throat: negative  Respiratory: No shortness of breath, dyspnea on exertion, cough, or hemoptysis  Cardiovascular: negative  Gastrointestinal: negative  Genitourinary: negative  Musculoskeletal: Back pain, leg pain, ankle swelling  Neurologic: negative  Psychiatric: negative  Hematologic/Lymphatic/Immunologic: negative  Endocrine: Hypothyroidism      Vital signs:  There were no vitals taken for this visit.    Current Outpatient Medications   Medication Sig Dispense Refill     CALCIUM PO Take  by mouth.       Multiple Vitamin (MULTI-VITAMINS PO) Take  by mouth.       SYNTHROID 112 MCG tablet          PHYSICAL EXAM:  General: Pleasant, NAD.   HEENT: Normocephalic, atraumatic, external ears and nose normal.   Respiratory: Normal respiratory effort.   Cardiovascular: Pulse is regular.   Musculoskeletal: Gait and station normal.  The joints of her fingers and toes without deformity.  There is no cyanosis of her nailbeds.   EXTREMITIES: Right lower extremity: Few small, palpable veins on the posteromedial right calf.  Scattered telangiectasias over the lateral and posterior lateral thighs and a few scattered on the legs.  No edema or stasis changes.    Left lower extremity: Palpable, small veins on the posteromedial left calf.  Scattered telangiectasias over the thighs and legs.  No edema or stasis changes..    PULSES: R/L (3=normal pulse, 0=no palpable pulse) dorsalis pedis: 3/3; posterior tibial: 3/3.      Neurologic: Grossly normal  Psychiatric: Mood, affect, judgment and insight are normal      ASSESSMENT:  Bilateral lower extremity telangiectasias and small calf veins with some associated pain.  We discussed the lower extremity vein anatomy, the pathophysiology of venous insufficiency and the option of continued conservative measures with very small risks of superficial thrombophlebitis, bleeding and progression of the disease process.    I see no reason to obtain venous competency studies as her symptoms are not significant enough and varicosities stairs.  We discussed treating the veins for cosmetic purposes utilizing polidocanol sclerotherapy.  Details of the procedure including risks of allergic reaction, deep antibiosis, ulceration, hyperpigmentation were all discussed.  The patient voiced understanding of the discussion and her questions were answered.    Her daughter is getting  in the end of August of this year and she plans on wearing a skirt.  Therefore, she hopes that we can improve the appearance of the veins in that regard.  I feel that we could probably have 2 sessions of cosmetic sclerotherapy if we can get her on the cancellation list.  She should make some improvement with that.  She understands that she could have hyperpigmentation that can take up to a year to fade, over.    PLAN:  Possible bilateral extremity cosmetic sclerotherapy     Silvestre Dick MD    Dictated using Dragon voice recognition software which may result in transcription errors        VEIN CLINIC LEG DRAWING:

## 2023-11-05 ENCOUNTER — HEALTH MAINTENANCE LETTER (OUTPATIENT)
Age: 63
End: 2023-11-05

## 2023-12-05 ENCOUNTER — ALLIED HEALTH/NURSE VISIT (OUTPATIENT)
Dept: VASCULAR SURGERY | Facility: CLINIC | Age: 63
End: 2023-12-05
Payer: COMMERCIAL

## 2023-12-05 ENCOUNTER — OFFICE VISIT (OUTPATIENT)
Dept: VASCULAR SURGERY | Facility: CLINIC | Age: 63
End: 2023-12-05
Payer: COMMERCIAL

## 2023-12-05 DIAGNOSIS — I78.1 SPIDER TELANGIECTASIS OF SKIN: Primary | ICD-10-CM

## 2023-12-05 PROCEDURE — S9999 SALES TAX: HCPCS | Performed by: SURGERY

## 2023-12-05 PROCEDURE — 99207 PR NO CHARGE NURSE ONLY: CPT

## 2023-12-05 PROCEDURE — A6533 GC STOCKING THIGHLNGTH 18-30: HCPCS

## 2023-12-05 PROCEDURE — 36468 NJX SCLRSNT SPIDER VEINS: CPT | Performed by: SURGERY

## 2023-12-05 NOTE — PROGRESS NOTES
Vein Clinic Sclerotherapy Note     Indications:  Bilateral lower extremity spider telangiectasias of cosmetic concern     Procedure:  Bilateral extremity cosmetic sclerotherapy     Procedure description  Details of procedure including risks of allergic reaction, deep vein thrombosis, ulceration, superficial thrombophlebitis and hyperpigmentation were discussed.  The patient voiced understanding and wished to proceed.  Informed consent was obtained.    I donned the Syris headlight and injected telangiectasias over the thighs and legs most numerous over the distal thighs medially and laterally bilaterally.  We also injected a few on the posterior aspect of her thighs.  We used a total of 3 syringes of 0.5% polidocanol foam.  I had the patient perform ankle pumps.    We cleaned her legs, had her don compression then had her walk for 10 minutes.  She will return in approximately 6 to 8 weeks in follow-up.    Sclerotherapy    Date/Time: 12/5/2023 9:13 AM    Performed by: Silvestre Dick MD  Authorized by: Silvestre Dick MD    Time out: Immediately prior to the procedure a time out was called    Type:  Cosmetic  Session:  Full  Procedure side:  Bilateral  Solution/Amount:  .5 POLIDOCANOL  Syringes:  3  Patient tolerance:  Patient tolerated the procedure well with no immediate complications  Wrap/Hose:  Saran Dick MD    Dictated using Dragon voice recognition software which may result in transcription errors

## 2023-12-05 NOTE — PROGRESS NOTES
Patient purchased 1 pair(s) of natural, thigh high, closed-toe, size 2 compression hose from the clinic today.     Informed patient all compression hose purchases are final.    MACEY Fallon on 12/5/2023 at 9:24 AM

## 2023-12-05 NOTE — LETTER
12/5/2023         RE: Sulema Murphy  [address] [place] [facility] [address]        Dear Colleague,    Thank you for referring your patient, Sulema Murphy, to the [facility] [place]. Please see a copy of my visit note below.        Vein Clinic Sclerotherapy Note     Indications:  Bilateral lower extremity spider telangiectasias of cosmetic concern     Procedure:  Bilateral extremity cosmetic sclerotherapy     Procedure description  Details of procedure including risks of allergic reaction, deep vein thrombosis, ulceration, superficial thrombophlebitis and hyperpigmentation were discussed.  The patient voiced understanding and wished to proceed.  Informed consent was obtained.    I donned the Syris headlight and injected telangiectasias over the thighs and legs most numerous over the distal thighs medially and laterally bilaterally.  We also injected a few on the posterior aspect of her thighs.  We used a total of 3 syringes of 0.5% polidocanol foam.  I had the patient perform ankle pumps.    We cleaned her legs, had her don compression then had her walk for 10 minutes.  She will return in approximately 6 to 8 weeks in follow-up.    Sclerotherapy    Date/Time: 12/5/2023 9:13 AM    Performed by: Silvestre Dick MD  Authorized by: Silvestre Dick MD    Time out: Immediately prior to the procedure a time out was called    Type:  Cosmetic  Session:  Full  Procedure side:  Bilateral  Solution/Amount:  .5 POLIDOCANOL  Syringes:  3  Patient tolerance:  Patient tolerated the procedure well with no immediate complications  Wrap/Hose:  Saran Dick MD    Dictated using Dragon voice recognition software which may result in transcription errors      Again, thank you for allowing me to participate in the care of your patient.        Sincerely,        Silvestre Dick MD

## 2024-01-30 ENCOUNTER — OFFICE VISIT (OUTPATIENT)
Dept: VASCULAR SURGERY | Facility: CLINIC | Age: 64
End: 2024-01-30
Payer: COMMERCIAL

## 2024-01-30 DIAGNOSIS — I78.1 SPIDER TELANGIECTASIS OF SKIN: Primary | ICD-10-CM

## 2024-01-30 PROCEDURE — S9999 SALES TAX: HCPCS | Performed by: SURGERY

## 2024-01-30 PROCEDURE — 36468 NJX SCLRSNT SPIDER VEINS: CPT | Performed by: SURGERY

## 2024-01-30 NOTE — LETTER
1/30/2024         RE: Sulema Murphy  [address] [place] [address]        Dear Colleague,    Thank you for referring your patient, Sulema Murphy, to the [facility] [place]. Please see a copy of my visit note below.        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  The patient voiced understanding and wished to proceed.  Informed consent was obtained.    I donned the Syris headlight and treated clusters of telangiectasias most numerous over the distal lateral right thigh extending into the right lateral popliteal fossa, a few on the distal medial thighs and legs bilaterally, few scattered on the anterior and anterolateral right thigh and on the popliteal fossas bilaterally with a few on the calf.  I injected all of these with 0.5% polidocanol foam using 2 syringes.  I had her perform ankle pumps.    We cleaned areas of trapped blood in the popliteal fossa and lateral to the knees with alcohol, made stab wounds with an 18-gauge needle and expressed thrombus.  We cleaned the areas, had her don compression and then had her walk for 10 minutes.    She will return in 6 weeks in follow-up or as needed    Sclerotherapy    Date/Time: 1/30/2024 11:03 AM    Performed by: Silvestre Dick MD  Authorized by: Silvestre Dick MD    Time out: Immediately prior to the procedure a time out was called    Type:  Cosmetic  Session:  Limited  Procedure side:  Bilateral  Solution/Amount:  .5 POLIDOCANOL  Syringes:  2  Evacuation of intraluminal thrombus under sterile conditions without complications.    Patient  tolerance:  Patient tolerated the procedure well with no immediate complications  Wrap/Hose:  Saran Dick MD    Dictated using Dragon voice recognition software which may result in transcription errors      Again, thank you for allowing me to participate in the care of your patient.        Sincerely,        Silvestre Dick MD

## 2024-01-30 NOTE — PROGRESS NOTES
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  The patient voiced understanding and wished to proceed.  Informed consent was obtained.    I donned the Syris headlight and treated clusters of telangiectasias most numerous over the distal lateral right thigh extending into the right lateral popliteal fossa, a few on the distal medial thighs and legs bilaterally, few scattered on the anterior and anterolateral right thigh and on the popliteal fossas bilaterally with a few on the calf.  I injected all of these with 0.5% polidocanol foam using 2 syringes.  I had her perform ankle pumps.    We cleaned areas of trapped blood in the popliteal fossa and lateral to the knees with alcohol, made stab wounds with an 18-gauge needle and expressed thrombus.  We cleaned the areas, had her don compression and then had her walk for 10 minutes.    She will return in 6 weeks in follow-up or as needed    Sclerotherapy    Date/Time: 1/30/2024 11:03 AM    Performed by: Silvestre Dick MD  Authorized by: Silvestre Dick MD    Time out: Immediately prior to the procedure a time out was called    Type:  Cosmetic  Session:  Limited  Procedure side:  Bilateral  Solution/Amount:  .5 POLIDOCANOL  Syringes:  2  Evacuation of intraluminal thrombus under sterile conditions without complications.    Patient tolerance:  Patient tolerated the procedure well with no immediate complications  Wrap/Hose:  Saran Dick MD    Dictated using Dragon voice recognition software which may result in transcription errors

## 2024-09-25 ENCOUNTER — HOSPITAL ENCOUNTER (OUTPATIENT)
Dept: MAMMOGRAPHY | Facility: CLINIC | Age: 64
Discharge: HOME | End: 2024-09-25
Attending: FAMILY MEDICINE | Admitting: FAMILY MEDICINE
Payer: COMMERCIAL

## 2024-09-25 DIAGNOSIS — Z12.31 VISIT FOR SCREENING MAMMOGRAM: ICD-10-CM

## 2024-09-25 PROCEDURE — 77063 BREAST TOMOSYNTHESIS BI: CPT
